# Patient Record
Sex: MALE | Race: WHITE | HISPANIC OR LATINO | URBAN - METROPOLITAN AREA
[De-identification: names, ages, dates, MRNs, and addresses within clinical notes are randomized per-mention and may not be internally consistent; named-entity substitution may affect disease eponyms.]

---

## 2017-02-15 ENCOUNTER — EMERGENCY (EMERGENCY)
Facility: HOSPITAL | Age: 62
LOS: 0 days | Discharge: HOME | End: 2017-02-16

## 2017-06-27 DIAGNOSIS — M25.571 PAIN IN RIGHT ANKLE AND JOINTS OF RIGHT FOOT: ICD-10-CM

## 2017-06-27 DIAGNOSIS — S93.401A SPRAIN OF UNSPECIFIED LIGAMENT OF RIGHT ANKLE, INITIAL ENCOUNTER: ICD-10-CM
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
## 2017-06-27 DIAGNOSIS — L03.116 CELLULITIS OF LEFT LOWER LIMB: ICD-10-CM
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
## 2017-06-27 DIAGNOSIS — E11.9 TYPE 2 DIABETES MELLITUS WITHOUT COMPLICATIONS: ICD-10-CM

## 2019-10-04 ENCOUNTER — EMERGENCY (EMERGENCY)
Facility: HOSPITAL | Age: 64
LOS: 0 days | Discharge: HOME | End: 2019-10-04
Attending: EMERGENCY MEDICINE | Admitting: EMERGENCY MEDICINE
Payer: MEDICARE

## 2019-10-04 VITALS
HEART RATE: 89 BPM | RESPIRATION RATE: 18 BRPM | SYSTOLIC BLOOD PRESSURE: 139 MMHG | DIASTOLIC BLOOD PRESSURE: 97 MMHG | OXYGEN SATURATION: 99 % | TEMPERATURE: 99 F

## 2019-10-04 DIAGNOSIS — R10.9 UNSPECIFIED ABDOMINAL PAIN: ICD-10-CM

## 2019-10-04 DIAGNOSIS — M54.5 LOW BACK PAIN: ICD-10-CM

## 2019-10-04 LAB
APPEARANCE UR: CLEAR — SIGNIFICANT CHANGE UP
BILIRUB UR-MCNC: NEGATIVE — SIGNIFICANT CHANGE UP
COLOR SPEC: SIGNIFICANT CHANGE UP
DIFF PNL FLD: NEGATIVE — SIGNIFICANT CHANGE UP
GLUCOSE UR QL: NEGATIVE — SIGNIFICANT CHANGE UP
KETONES UR-MCNC: NEGATIVE — SIGNIFICANT CHANGE UP
LEUKOCYTE ESTERASE UR-ACNC: NEGATIVE — SIGNIFICANT CHANGE UP
NITRITE UR-MCNC: NEGATIVE — SIGNIFICANT CHANGE UP
PH UR: 6 — SIGNIFICANT CHANGE UP (ref 5–8)
PROT UR-MCNC: SIGNIFICANT CHANGE UP
SP GR SPEC: 1.01 — SIGNIFICANT CHANGE UP (ref 1.01–1.02)
UROBILINOGEN FLD QL: SIGNIFICANT CHANGE UP

## 2019-10-04 PROCEDURE — 99283 EMERGENCY DEPT VISIT LOW MDM: CPT

## 2019-10-04 RX ORDER — METHOCARBAMOL 500 MG/1
2 TABLET, FILM COATED ORAL
Qty: 10 | Refills: 0
Start: 2019-10-04 | End: 2019-10-08

## 2019-10-04 RX ORDER — ACETAMINOPHEN 500 MG
975 TABLET ORAL ONCE
Refills: 0 | Status: COMPLETED | OUTPATIENT
Start: 2019-10-04 | End: 2019-10-04

## 2019-10-04 RX ADMIN — Medication 975 MILLIGRAM(S): at 21:44

## 2019-10-04 NOTE — ED PROVIDER NOTE - PATIENT PORTAL LINK FT
You can access the FollowMyHealth Patient Portal offered by [facility] by registering at the following website: [email or web address]. By joining [employer]’s FollowMyHealth portal, you will also be able to view your health information using other applications (apps) compatible with our system.

## 2019-10-04 NOTE — ED PROVIDER NOTE - NSFOLLOWUPCLINICS_GEN_ALL_ED_FT
[facility]  Medicine  [address], [place]   Phone: [phone]  Fax:   Follow Up Time: 1-3 Days

## 2019-10-04 NOTE — ED PROVIDER NOTE - PHYSICAL EXAMINATION
General: Awake, alert, No acute distress  Eyes: PERRL, EOMI, no icterus, lids and conjunctivae are normal  ENT: External inspection normal, pink/moist membranes, pharynx normal, no pharyngeal erythema/exudate  CV: S1S2, regular rate and rhythm, no murmur/gallops/rubs, no JVD, 2+ pulses b/l, no edema, warm/well-perfused  Respiratory: Normal respiratory rate/effort, no respiratory distress, no wheezing/rales/rhonchi, normal voice, speaking full sentences, lungs clear to auscultation b/l, no retractions, no stridor  Abdomen: Soft abdomen, no tender/distended/guarding/rebound, no CVA tenderness  Musculoskeletal: (+) Left lower back and upper buttock pain (mild ttp). FROM all 4 extremities,  N/V intact, pelvis stable, no TLS spinal tender/deform/step-offs, no edmund tender/deform, stable gait,  negative straight leg.  Neck: FROM neck, supple, no meningismus, trachea midline, no JVD, no cspine tender/step-offs, no lymphadenopathy  Integumentary: Color normal for race, warm and dry, no rash  Neuro: Oriented x3, CN 2-12 grossly intact, normal motor, normal sensory, normal gait, normal cerebellar  Psych: Oriented x3, mood normal, affect normal

## 2019-10-04 NOTE — ED PROVIDER NOTE - OBJECTIVE STATEMENT
64F m pmhx htn p/w left lower back pain, 6/10, shooting pain, toe the left lateral thing. Movement make it worse, rest makes it better. According to family, pt has been sitting home 2/2 to movement. Contrary to triage note pt's pain is in the lower back and lateral thigh.

## 2019-10-04 NOTE — ED PROVIDER NOTE - CLINICAL SUMMARY MEDICAL DECISION MAKING FREE TEXT BOX
64F m pmhx htn p/w left lower back pain, 6/10, shooting, 64F m pmhx htn p/w left lower back pain, 6/10, shooting pain, toe the left lateral thing. Movement make it worse, rest makes it better. According to family, pt has been sitting home 2/2 to movement. On physical pt noted to have left lower back and upper buttock pain to palpation. Also to straight left both legs without difficulty. Pt ambulatory with no gait dysfunction. Pt given Tylenol and script for flexeril sent to pharmacy. Pt's pain seems likely to be msk related with no neuro or vascular deficits. DC home with pmd f/u

## 2019-10-04 NOTE — ED PROVIDER NOTE - NS ED ROS FT
Constitutional:  No behavior changes, fevers/chills.    Head: No injury, headache, LOC, dizziness/LH.    Eyes:  No visual changes, eye pain, redness, or discharge; no injury; no foreign body sensation.    ENMT:  No ear pain or discharge, hearing problems; no pain or injuries to the nose, ears, mouth, or throat.    Neck:  No pain, stiffness, injury; no loss of range of motion.    Cardiac: No chest pain, palpitations, diaphoresis.    Chest/respiratory: No cough, wheezing, shortness of breath, chest tightness, or trouble breathing; no injuries.    GI: No nausea, vomiting, diarrhea or abdominal pain; no injuries. No changes in PO intake. No melena/brbpr.    :  No dysuria, hematuria, urgency, or injuries. No changes in urine output. No flanl     MS: (+) back pain, weakness, injury, numbness or tingling; no loss of range of motion. No edema. No calf pain/swelling/erythema.    Back:  No pain or injury.    Skin:  No rashes or color changes; no lacerations or abrasions.  Social: No sick contacts, recent travel or rash.

## 2019-10-06 LAB
CULTURE RESULTS: NO GROWTH — SIGNIFICANT CHANGE UP
SPECIMEN SOURCE: SIGNIFICANT CHANGE UP

## 2023-04-25 PROBLEM — I10 ESSENTIAL (PRIMARY) HYPERTENSION: Chronic | Status: ACTIVE | Noted: 2019-10-04

## 2023-05-02 PROBLEM — Z00.00 ENCOUNTER FOR PREVENTIVE HEALTH EXAMINATION: Status: ACTIVE | Noted: 2023-05-02

## 2023-05-09 ENCOUNTER — APPOINTMENT (OUTPATIENT)
Dept: RHEUMATOLOGY | Facility: CLINIC | Age: 68
End: 2023-05-09
Payer: MEDICARE

## 2023-05-09 VITALS
BODY MASS INDEX: 43.87 KG/M2 | OXYGEN SATURATION: 93 % | SYSTOLIC BLOOD PRESSURE: 127 MMHG | WEIGHT: 273 LBS | HEART RATE: 95 BPM | DIASTOLIC BLOOD PRESSURE: 84 MMHG | HEIGHT: 66 IN

## 2023-05-09 DIAGNOSIS — M79.672 PAIN IN RIGHT FOOT: ICD-10-CM

## 2023-05-09 DIAGNOSIS — M79.671 PAIN IN RIGHT FOOT: ICD-10-CM

## 2023-05-09 PROCEDURE — 99204 OFFICE O/P NEW MOD 45 MIN: CPT

## 2023-05-09 NOTE — PHYSICAL EXAM
[General Appearance - Alert] : alert [General Appearance - In No Acute Distress] : in no acute distress [Sclera] : the sclera and conjunctiva were normal [PERRL With Normal Accommodation] : pupils were equal in size, round, and reactive to light [Extraocular Movements] : extraocular movements were intact [Outer Ear] : the ears and nose were normal in appearance [Oropharynx] : the oropharynx was normal [Neck Appearance] : the appearance of the neck was normal [Neck Cervical Mass (___cm)] : no neck mass was observed [Jugular Venous Distention Increased] : there was no jugular-venous distention [Thyroid Diffuse Enlargement] : the thyroid was not enlarged [Thyroid Nodule] : there were no palpable thyroid nodules [Auscultation Breath Sounds / Voice Sounds] : lungs were clear to auscultation bilaterally [Heart Rate And Rhythm] : heart rate was normal and rhythm regular [Heart Sounds Gallop] : no gallops [Heart Sounds] : normal S1 and S2 [Murmurs] : no murmurs [Heart Sounds Pericardial Friction Rub] : no pericardial rub [Bowel Sounds] : normal bowel sounds [Abdomen Soft] : soft [Abdomen Tenderness] : non-tender [Abdomen Mass (___ Cm)] : no abdominal mass palpated [Abnormal Walk] : normal gait [Nail Clubbing] : no clubbing  or cyanosis of the fingernails [Musculoskeletal - Swelling] : no joint swelling seen [Motor Tone] : muscle strength and tone were normal [Skin Color & Pigmentation] : normal skin color and pigmentation [Skin Turgor] : normal skin turgor [] : no rash [Sensation] : the sensory exam was normal to light touch and pinprick [Motor Exam] : the motor exam was normal [Affect] : the affect was normal [Mood] : the mood was normal

## 2023-05-09 NOTE — ASSESSMENT
[FreeTextEntry1] : Bilateral alternating big toe, foot and ankle pain\par -History sounds like gout with episodic flares of symptoms especially in his big toe\par -Check bilateral foot x-ray, uric acid, RF, CCP, inflammatory markers, CBC, CMP\par -Start allopurinol 100 mg daily\par -Start colchicine 0.6 mg daily\par \par \par F/u 1 month

## 2023-05-09 NOTE — REVIEW OF SYSTEMS
[Negative] : Integumentary [FreeTextEntry3] : History of congenital cataracts [FreeTextEntry6] : +LUCAS on CPAP

## 2023-05-16 ENCOUNTER — OUTPATIENT (OUTPATIENT)
Dept: OUTPATIENT SERVICES | Facility: HOSPITAL | Age: 68
LOS: 1 days | End: 2023-05-16
Payer: MEDICARE

## 2023-05-16 ENCOUNTER — RESULT REVIEW (OUTPATIENT)
Age: 68
End: 2023-05-16

## 2023-05-16 DIAGNOSIS — M79.671 PAIN IN RIGHT FOOT: ICD-10-CM

## 2023-05-16 PROCEDURE — 73630 X-RAY EXAM OF FOOT: CPT | Mod: 50

## 2023-05-16 PROCEDURE — 73630 X-RAY EXAM OF FOOT: CPT | Mod: 26,50

## 2023-05-17 DIAGNOSIS — M79.671 PAIN IN RIGHT FOOT: ICD-10-CM

## 2023-05-17 LAB
ALBUMIN SERPL ELPH-MCNC: 4.3 G/DL
ALP BLD-CCNC: 83 U/L
ALT SERPL-CCNC: 37 U/L
ANION GAP SERPL CALC-SCNC: 10 MMOL/L
AST SERPL-CCNC: 25 U/L
BASOPHILS # BLD AUTO: 0.03 K/UL
BASOPHILS NFR BLD AUTO: 0.5 %
BILIRUB SERPL-MCNC: 0.3 MG/DL
BUN SERPL-MCNC: 14 MG/DL
CALCIUM SERPL-MCNC: 9.6 MG/DL
CHLORIDE SERPL-SCNC: 104 MMOL/L
CO2 SERPL-SCNC: 26 MMOL/L
CREAT SERPL-MCNC: 0.8 MG/DL
CRP SERPL-MCNC: 8.2 MG/L
EGFR: 96 ML/MIN/1.73M2
EOSINOPHIL # BLD AUTO: 0.19 K/UL
EOSINOPHIL NFR BLD AUTO: 2.9 %
ERYTHROCYTE [SEDIMENTATION RATE] IN BLOOD BY WESTERGREN METHOD: 29 MM/HR
GLUCOSE SERPL-MCNC: 168 MG/DL
HCT VFR BLD CALC: 43.3 %
HGB BLD-MCNC: 13.6 G/DL
IMM GRANULOCYTES NFR BLD AUTO: 0.3 %
LYMPHOCYTES # BLD AUTO: 2.2 K/UL
LYMPHOCYTES NFR BLD AUTO: 33.6 %
MAN DIFF?: NORMAL
MCHC RBC-ENTMCNC: 27.3 PG
MCHC RBC-ENTMCNC: 31.4 G/DL
MCV RBC AUTO: 86.8 FL
MONOCYTES # BLD AUTO: 0.61 K/UL
MONOCYTES NFR BLD AUTO: 9.3 %
NEUTROPHILS # BLD AUTO: 3.49 K/UL
NEUTROPHILS NFR BLD AUTO: 53.4 %
PLATELET # BLD AUTO: 227 K/UL
POTASSIUM SERPL-SCNC: 4.4 MMOL/L
PROT SERPL-MCNC: 7.5 G/DL
RBC # BLD: 4.99 M/UL
RBC # FLD: 15.4 %
RHEUMATOID FACT SER QL: <10 IU/ML
SODIUM SERPL-SCNC: 140 MMOL/L
URATE SERPL-MCNC: 8.7 MG/DL
WBC # FLD AUTO: 6.54 K/UL

## 2023-05-18 LAB
CCP AB SER IA-ACNC: <8 UNITS
RF+CCP IGG SER-IMP: NEGATIVE

## 2023-06-08 ENCOUNTER — APPOINTMENT (OUTPATIENT)
Dept: RHEUMATOLOGY | Facility: CLINIC | Age: 68
End: 2023-06-08
Payer: MEDICARE

## 2023-06-08 VITALS
BODY MASS INDEX: 43.87 KG/M2 | HEIGHT: 66 IN | DIASTOLIC BLOOD PRESSURE: 79 MMHG | SYSTOLIC BLOOD PRESSURE: 154 MMHG | HEART RATE: 104 BPM | WEIGHT: 273 LBS

## 2023-06-08 DIAGNOSIS — R52 PAIN, UNSPECIFIED: ICD-10-CM

## 2023-06-08 PROCEDURE — 99214 OFFICE O/P EST MOD 30 MIN: CPT

## 2023-06-08 NOTE — ASSESSMENT
[FreeTextEntry1] : Bilateral alternating big toe, foot and ankle pain\par -History sounds like gout with episodic flares of symptoms especially in his big toe\par -Uric acid 8.7 \par -Start taking allopurinol 100 mg daily\par -colchicine 0.6 mg daily PRN\par -Check uric acid in 3 months\par \par Left lower leg burning pain\par -History of diabetes and low back surgery\par -See neurology for evaluation\par \par \par \par \par

## 2023-06-08 NOTE — PHYSICAL EXAM
[General Appearance - Alert] : alert [General Appearance - In No Acute Distress] : in no acute distress [Sclera] : the sclera and conjunctiva were normal [PERRL With Normal Accommodation] : pupils were equal in size, round, and reactive to light [Extraocular Movements] : extraocular movements were intact [Outer Ear] : the ears and nose were normal in appearance [Oropharynx] : the oropharynx was normal [Neck Appearance] : the appearance of the neck was normal [Neck Cervical Mass (___cm)] : no neck mass was observed [Jugular Venous Distention Increased] : there was no jugular-venous distention [Thyroid Diffuse Enlargement] : the thyroid was not enlarged [Thyroid Nodule] : there were no palpable thyroid nodules [Auscultation Breath Sounds / Voice Sounds] : lungs were clear to auscultation bilaterally [Heart Rate And Rhythm] : heart rate was normal and rhythm regular [Heart Sounds] : normal S1 and S2 [Heart Sounds Gallop] : no gallops [Murmurs] : no murmurs [Heart Sounds Pericardial Friction Rub] : no pericardial rub [Bowel Sounds] : normal bowel sounds [Abdomen Tenderness] : non-tender [Abdomen Soft] : soft [Abdomen Mass (___ Cm)] : no abdominal mass palpated [Abnormal Walk] : normal gait [Nail Clubbing] : no clubbing  or cyanosis of the fingernails [Musculoskeletal - Swelling] : no joint swelling seen [Motor Tone] : muscle strength and tone were normal [Skin Color & Pigmentation] : normal skin color and pigmentation [Skin Turgor] : normal skin turgor [] : no rash [Sensation] : the sensory exam was normal to light touch and pinprick [Motor Exam] : the motor exam was normal [Affect] : the affect was normal [Mood] : the mood was normal

## 2023-06-08 NOTE — HISTORY OF PRESENT ILLNESS
[FreeTextEntry1] : 6/8/23:\par He reports 4 days since last visit that he used colchicine and allopurinol when he felt pain when putting on his shoes. He has not had red, hot or swollen joints. He gets diarrhea with colchicine. He reports 2 month history of burning pain in his left lower leg radiating to his foot that feels like it's "on fire". He was put on gabapentin 300 mg 4x/daily by his endocrinologist that helped his R leg burning pain. Of note he has low back surgery in January and had burning pains but they were not as bad.\par \par \par Initial visit:\par He reports pain and swelling in his big toes and feet initially his right foot for a few days, flares and then goes away and travels to his left foot and toes for a few days. He can't wiggle his toe because of the pain and swelling when he gets it. Symptoms would come up again a few weeks and this has been ongoing for a couple of years. Ibuprofein helped his symptoms Tylenol didn't help. Last attack was 2 weeks ago. He was prescribed colchicine by his PCP that helped resolve his pain and he took this only once. Subsequent attacks after colchicine have not been as bad. He sometimes gets pain in his ankles too. His uric acid was apparently normal.

## 2023-06-29 ENCOUNTER — APPOINTMENT (OUTPATIENT)
Dept: VASCULAR SURGERY | Facility: CLINIC | Age: 68
End: 2023-06-29
Payer: MEDICARE

## 2023-06-29 VITALS — DIASTOLIC BLOOD PRESSURE: 79 MMHG | SYSTOLIC BLOOD PRESSURE: 148 MMHG

## 2023-06-29 DIAGNOSIS — Z86.69 PERSONAL HISTORY OF OTHER DISEASES OF THE NERVOUS SYSTEM AND SENSE ORGANS: ICD-10-CM

## 2023-06-29 PROCEDURE — 99203 OFFICE O/P NEW LOW 30 MIN: CPT

## 2023-06-29 PROCEDURE — 93970 EXTREMITY STUDY: CPT

## 2023-06-29 NOTE — DATA REVIEWED
[FreeTextEntry1] : I performed a venous duplex which was medically necessary to evaluate for venous reflux. It showed no evidence of GSV reflux bilaterally, chronic changes in the femoral and popliteal veins bilaterally.\par

## 2023-06-29 NOTE — HISTORY OF PRESENT ILLNESS
[FreeTextEntry1] : 69 y/o gentleman with h/o DM, and neuropathy presents for worsening left leg swelling and discoloration, denies any h/o DVT. No h/o cellulitis or ulcers. Tried compression stockings in the past but could not tolerate them.

## 2023-06-29 NOTE — ASSESSMENT
[FreeTextEntry1] : 67 y/o gentleman with venous insufficiency.\par \par No evidence of acute DVT in the lower extremities on duplex today. \par \par I recommend compression with ace bandage daily.

## 2023-06-30 ENCOUNTER — APPOINTMENT (OUTPATIENT)
Dept: ENDOCRINOLOGY | Facility: CLINIC | Age: 68
End: 2023-06-30
Payer: MEDICARE

## 2023-06-30 VITALS
TEMPERATURE: 97.4 F | HEART RATE: 86 BPM | OXYGEN SATURATION: 98 % | DIASTOLIC BLOOD PRESSURE: 88 MMHG | HEIGHT: 66 IN | WEIGHT: 273 LBS | SYSTOLIC BLOOD PRESSURE: 148 MMHG | BODY MASS INDEX: 43.87 KG/M2

## 2023-06-30 PROCEDURE — 99203 OFFICE O/P NEW LOW 30 MIN: CPT

## 2023-06-30 NOTE — REVIEW OF SYSTEMS
[Fatigue] : fatigue [Recent Weight Gain (___ Lbs)] : no recent weight gain [Recent Weight Loss (___ Lbs)] : no recent weight loss [Dysphagia] : no dysphagia [Neck Pain] : no neck pain [Dysphonia] : no dysphonia [Chest Pain] : no chest pain [Palpitations] : no palpitations [Fast Heart Rate] : heart rate is not fast [Lower Ext Edema] : lower extremity edema [Shortness Of Breath] : no shortness of breath [SOB on Exertion] : no shortness of breath on exertion [Nausea] : no nausea [Constipation] : constipation [Vomiting] : no vomiting [As Noted in HPI] : as noted in HPI [Acanthosis] : acanthosis [Pain/Numbness of Digits] : pain/numbness of digits [Cold Intolerance] : no cold intolerance [Heat Intolerance] : no heat intolerance

## 2023-06-30 NOTE — REASON FOR VISIT
[Initial Evaluation] : an initial evaluation [DM Type 2] : DM Type 2 [Weight Management/Obesity] : weight management/obesity [FreeTextEntry2] : dr damon  no...

## 2023-06-30 NOTE — HISTORY OF PRESENT ILLNESS
[FreeTextEntry1] : 68 year old male patient with  type 2 DM , Obesity , LUCAS , HTN who present for evaluation of type 2 DM \par \par \par Diagnosis: few years ago \par Current Regimen: metformin 500 mg BID \par Previous regimens: used ozempic for maybe  1 month but insurance stopped covering it \par Compliance: ok\par SMBG/CGM :  ok\par Hypoglycemia: no \par Polyuria/polydipsia :  no \par Weight change/BMI: same\par Diet: try to limit carbs \par Exercise: limited due to back problems \par HBa1c trend: 6/2023: A1c 7.1%  \par \par \par .prevention  \par Statin: no \par ACE/ARB :  benazepril \par Eye examination:  followed has glaucoma , due to go now \par Neuropathy: no but has PVD and skin changes \par

## 2023-06-30 NOTE — ASSESSMENT
[FreeTextEntry1] : 68 year old male patient with  type 2 DM , Obesity , LUCAS , HTN who present for evaluation of type 2 DM \par \par # type 2 Dm , obesity , HTN \par - A1c 7.1% on  metformin 500 mg BID \par - Previous regimens: used ozempic for maybe  1 month but insurance stopped covering it \par - increase metformin to 1000 mg BID for now, and monitor FS \par - will benefit from Glp1 agonist if numbers trending up \par - check lipid panel \par - check ACR continue Ace inh \par - opthalmology follow up and podiatry follow up , seen also by vascular \par

## 2023-06-30 NOTE — PHYSICAL EXAM
[Alert] : alert [Well Nourished] : well nourished [Obese] : obese [No Acute Distress] : no acute distress [No Lid Lag] : no lid lag [Thyroid Not Enlarged] : the thyroid was not enlarged [No Respiratory Distress] : no respiratory distress [No Accessory Muscle Use] : no accessory muscle use [Clear to Auscultation] : lungs were clear to auscultation bilaterally [Normal S1, S2] : normal S1 and S2 [No Murmurs] : no murmurs [Regular Rhythm] : with a regular rhythm [Not Tender] : non-tender [Soft] : abdomen soft [No Stigmata of Cushings Syndrome] : no stigmata of Cushings Syndrome [Acanthosis Nigricans] : acanthosis nigricans present [No Tremors] : no tremors [Oriented x3] : oriented to person, place, and time

## 2023-07-19 ENCOUNTER — APPOINTMENT (OUTPATIENT)
Dept: NEUROLOGY | Facility: CLINIC | Age: 68
End: 2023-07-19
Payer: MEDICARE

## 2023-07-19 VITALS
WEIGHT: 270 LBS | HEART RATE: 97 BPM | HEIGHT: 66 IN | SYSTOLIC BLOOD PRESSURE: 153 MMHG | DIASTOLIC BLOOD PRESSURE: 112 MMHG | BODY MASS INDEX: 43.39 KG/M2

## 2023-07-19 DIAGNOSIS — Z87.898 PERSONAL HISTORY OF OTHER SPECIFIED CONDITIONS: ICD-10-CM

## 2023-07-19 PROCEDURE — 99204 OFFICE O/P NEW MOD 45 MIN: CPT

## 2023-07-19 NOTE — HISTORY OF PRESENT ILLNESS
[FreeTextEntry1] : Mr. Chowdhury is a 68-year-old male who presents today with a history of chronic diabetes and obesity. He complains of symptoms of polyneuropathy and left lumbar radiculopathy. He was operated on 1/3/2023 at [facility] in [place], [place] for symptoms of left sided sciatica. This improved his walking which was very impaired due to pain from the left lumbar region. He did have burning sensations in both lower extremities which only improved slightly in the right leg on gabapentin 400 mg TID. He still has burning sensation in the medial aspect of the left leg to the left ankle. Patient also has swelling in the left lower leg and pigmentation/discoloration which he has had chronically. His A1C hemoglobin has improved from 7.7 to 7.1,

## 2023-07-19 NOTE — REVIEW OF SYSTEMS
[Abnormal Sensation] : an abnormal sensation [Arthralgias] : arthralgias [Limb Swelling] : limb swelling [de-identified] : limping gait tending to drag the left leg

## 2023-07-19 NOTE — ASSESSMENT
[FreeTextEntry1] : Impression is that of diabetic polyneuropathy, morbid obesity, chronic venous insufficiency, and left lumbar radiculopathy. I requested the results of the MRI of the lumbar spine from [facility] in [place]. Gabapentin was increased to 600 mg QID. He will undergo EMG/NCVs of the upper and lower extremities to assess the severity of his neuropathy. We will see him back in follow up in 1 month for reevaluation. \par \par Total clinician time spent today on the patient is 40 minutes including preparing to see the patient, obtaining and/or reviewing and confirming history, performing medically necessary and appropriate examination, counseling and educating the patient and/or family, documenting clinical information in the EHR and communicating and/or referring to other healthcare professionals.\par \par Entered by Radha Redmond acting as scribe for Dr. Maya.\par \par \par The documentation recorded by the scribe, in my presence, accurately reflects the service I personally performed, and the decisions made by me with my edits as appropriate. \par Tello Maya MD, FAAN, FACP\par Diplomate American Board of Psychiatry & Neurology\par \par

## 2023-07-19 NOTE — PHYSICAL EXAM
[FreeTextEntry1] : PHYSICAL EXAMINATION:\par Head: Normocephalic, atraumatic. Negative TA tenderness/prominence. \par \par Neck: Supple with full range of motion; nontender with negative bilateral Spurling's signs. \par \par Spine: Full range of motion; nontender. Negative straight leg raise maneuvers. \par \par Extremities: Swelling in the left leg below the knee. Evidence of venous insufficiency in the lower leg on the left. \par \par NEUROLOGICAL EXAMINATION: \par \par Mental Status: Patient is a good informant with intact orientation, attention, concentration, recent and remote memory. Language evaluation reveals no evidence of aphasia. Fund of knowledge is normal. \par \par Cranial Nerves Cranial Nerves: \par \par II, III, IV, VI: Pupils are equal, round, and reactive to light and accommodation. No evidence of afferent pupillary defect. Visual fields are full to confrontation. Eye movements are full without evidence of nystagmus or internuclear ophthalmoplegia. Funduscopic examination reveals sharp disc margins. \par \par V: Normal jaw movements. Normal facial sensation. \par \par VII: Normal facial motor testing. \par \par VIII: Grossly normal hearing bilaterally. \par \par IX, X: Palate moves symmetrically. No dysarthria. \par \par XI: Normal shoulder shrug and sternocleidomastoid power. \par \par XII: Tongue appears normal and protrudes in the midline. \par \par Motor: Normal bulk, tone, and power throughout. \par \par Muscle Stretch Reflexes (right/left): Hypoactive in the UEs, unobtainable in the knees and ankles.\par \par Coordination: Normal finger to nose, no truncal ataxia and no tremor. \par \par Sensation: Normal primary sensation. Normal double simultaneous stimulation. \par \par Gait and Station: Waddling, limping gait dragging the left leg with small steps that are shuffling.. Romberg and tandem were not able to be tested due to instability walking with a  cane.

## 2023-07-25 ENCOUNTER — APPOINTMENT (OUTPATIENT)
Dept: NEUROLOGY | Facility: CLINIC | Age: 68
End: 2023-07-25
Payer: MEDICARE

## 2023-07-25 PROCEDURE — 95886 MUSC TEST DONE W/N TEST COMP: CPT

## 2023-07-25 PROCEDURE — 95912 NRV CNDJ TEST 11-12 STUDIES: CPT

## 2023-07-28 ENCOUNTER — APPOINTMENT (OUTPATIENT)
Dept: NEUROLOGY | Facility: CLINIC | Age: 68
End: 2023-07-28
Payer: MEDICARE

## 2023-07-28 PROCEDURE — 95886 MUSC TEST DONE W/N TEST COMP: CPT

## 2023-07-28 PROCEDURE — 95911 NRV CNDJ TEST 9-10 STUDIES: CPT

## 2023-08-09 PROBLEM — Z78.9 NON-SMOKER: Status: ACTIVE | Noted: 2023-05-09

## 2023-08-09 PROBLEM — Z86.79 HISTORY OF HYPERTENSION: Status: RESOLVED | Noted: 2023-05-09 | Resolved: 2023-08-09

## 2023-08-09 PROBLEM — Z82.49 FAMILY HISTORY OF HYPERTENSION: Status: ACTIVE | Noted: 2023-06-30

## 2023-08-09 PROBLEM — Z82.49 FAMILY HISTORY OF CARDIAC DISORDER: Status: ACTIVE | Noted: 2023-06-30

## 2023-08-09 PROBLEM — Z87.39 HISTORY OF GOUT: Status: RESOLVED | Noted: 2023-06-29 | Resolved: 2023-08-09

## 2023-08-09 PROBLEM — Z78.9 SOCIAL ALCOHOL USE: Status: ACTIVE | Noted: 2023-05-09

## 2023-08-09 PROBLEM — Z86.69 HISTORY OF SLEEP APNEA: Status: RESOLVED | Noted: 2023-06-29 | Resolved: 2023-08-09

## 2023-08-10 ENCOUNTER — APPOINTMENT (OUTPATIENT)
Dept: CARDIOLOGY | Facility: CLINIC | Age: 68
End: 2023-08-10
Payer: MEDICARE

## 2023-08-10 VITALS — SYSTOLIC BLOOD PRESSURE: 124 MMHG | DIASTOLIC BLOOD PRESSURE: 84 MMHG | HEART RATE: 113 BPM

## 2023-08-10 VITALS — WEIGHT: 281 LBS | BODY MASS INDEX: 45.16 KG/M2 | TEMPERATURE: 97.6 F | HEIGHT: 66 IN

## 2023-08-10 DIAGNOSIS — Z82.49 FAMILY HISTORY OF ISCHEMIC HEART DISEASE AND OTHER DISEASES OF THE CIRCULATORY SYSTEM: ICD-10-CM

## 2023-08-10 DIAGNOSIS — Z86.69 PERSONAL HISTORY OF OTHER DISEASES OF THE NERVOUS SYSTEM AND SENSE ORGANS: ICD-10-CM

## 2023-08-10 DIAGNOSIS — Z78.9 OTHER SPECIFIED HEALTH STATUS: ICD-10-CM

## 2023-08-10 DIAGNOSIS — Z87.39 PERSONAL HISTORY OF OTHER DISEASES OF THE MUSCULOSKELETAL SYSTEM AND CONNECTIVE TISSUE: ICD-10-CM

## 2023-08-10 DIAGNOSIS — R07.9 CHEST PAIN, UNSPECIFIED: ICD-10-CM

## 2023-08-10 DIAGNOSIS — Z86.79 PERSONAL HISTORY OF OTHER DISEASES OF THE CIRCULATORY SYSTEM: ICD-10-CM

## 2023-08-10 DIAGNOSIS — Z87.891 PERSONAL HISTORY OF NICOTINE DEPENDENCE: ICD-10-CM

## 2023-08-10 PROCEDURE — 93000 ELECTROCARDIOGRAM COMPLETE: CPT

## 2023-08-10 PROCEDURE — 99204 OFFICE O/P NEW MOD 45 MIN: CPT

## 2023-08-10 RX ORDER — DORZOLAMIDE HYDROCHLORIDE 20 MG/ML
2 SOLUTION OPHTHALMIC
Refills: 0 | Status: ACTIVE | COMMUNITY

## 2023-08-10 RX ORDER — METFORMIN HYDROCHLORIDE 500 MG/1
500 TABLET, COATED ORAL
Refills: 0 | Status: DISCONTINUED | COMMUNITY
End: 2023-08-10

## 2023-08-10 RX ORDER — GABAPENTIN 300 MG/1
300 CAPSULE ORAL
Refills: 0 | Status: DISCONTINUED | COMMUNITY
End: 2023-08-10

## 2023-08-10 NOTE — PHYSICAL EXAM
[Well Developed] : well developed [Well Nourished] : well nourished [No Acute Distress] : no acute distress [Normal Conjunctiva] : normal conjunctiva [Normal Venous Pressure] : normal venous pressure [No Carotid Bruit] : no carotid bruit [5th Left ICS - MCL] : palpated at the 5th LICS in the midclavicular line [Normal] : normal [No Precordial Heave] : no precordial heave was noted [Normal Rate] : normal [Rhythm Regular] : regular [Normal S1] : normal S1 [Normal S2] : normal S2 [No Murmur] : no murmurs heard [No Pitting Edema] : no pitting edema present [2+] : left 2+ [Clear Lung Fields] : clear lung fields [Good Air Entry] : good air entry [No Respiratory Distress] : no respiratory distress  [Soft] : abdomen soft [Non Tender] : non-tender [No Masses/organomegaly] : no masses/organomegaly [Normal Bowel Sounds] : normal bowel sounds [Normal Gait] : normal gait [No Edema] : no edema [No Cyanosis] : no cyanosis [No Clubbing] : no clubbing [No Varicosities] : no varicosities [No Rash] : no rash [No Skin Lesions] : no skin lesions [Moves all extremities] : moves all extremities [No Focal Deficits] : no focal deficits [Normal Speech] : normal speech [Alert and Oriented] : alert and oriented [Normal memory] : normal memory [Edema ___] : edema [unfilled] [Venous stasis] : venous stasis [Venous varicosities] : venous varicosities [de-identified] : LLE with chronic venous skin changes and indurated edema.

## 2023-08-10 NOTE — ASSESSMENT
[FreeTextEntry1] : Chest pain/leg edema - will obtain TTE - Nuclear stress test. Patient walks with a cane due to chronic back pain and Hx of back surgery - No DVT, no reflux on venous doppler  DM type II: HA1c 7.1%.  - follow with endocrinology - continue Metformin  - will start Rosuvastatin 20 mg PO daily - Lipid profile   HTN: BP at goal per ACC/AHA 2018 guidelines - continue Irbesartan - low sodium diet  F/U in 3 months

## 2023-08-10 NOTE — HISTORY OF PRESENT ILLNESS
[FreeTextEntry1] : Mr. Chowdhury is a 67yo M with PMHx of HTN, DM, gout, venous insufficiency, LUCAS who presents to establish care. His PMD is Dr. Jeremiah Florence referred him to cardiology for evaluation. Patient reports one episode of chest pressure a few months ago which lasted a few minutes and was relieved with rest. He was seen by cardiology more than 20 years ago. Denies palpitations, dyspnea, syncope. Has Hx of chronic back pain from lumbar radiculopathy which limits his mobility.

## 2023-08-10 NOTE — REVIEW OF SYSTEMS
Cardiac [Negative] : Heme/Lymph [Chest Discomfort] : chest discomfort [Lower Ext Edema] : lower extremity edema [FreeTextEntry9] : reports "burning" in LE, Hx of neuropathy

## 2023-08-10 NOTE — REASON FOR VISIT
[Other: ____] : [unfilled] [FreeTextEntry1] : Diagnostic Tests: ------------------------ EK/10/23-Sinus tachycardia at 113 bpm. Incomplete RBBB. LAFB.  ------------------------ US:  23-LE Venous: Negative for DVT. No reflux on right. S/P LGSV stripping. No reflux seen on left.

## 2023-08-15 ENCOUNTER — APPOINTMENT (OUTPATIENT)
Dept: NEUROLOGY | Facility: CLINIC | Age: 68
End: 2023-08-15
Payer: MEDICARE

## 2023-08-15 VITALS — BODY MASS INDEX: 45.48 KG/M2 | WEIGHT: 283 LBS | HEIGHT: 66 IN

## 2023-08-15 PROCEDURE — 99214 OFFICE O/P EST MOD 30 MIN: CPT

## 2023-08-15 NOTE — REVIEW OF SYSTEMS
[Abnormal Sensation] : an abnormal sensation [Arthralgias] : arthralgias [Limb Swelling] : limb swelling [de-identified] : limping gait tending to drag the left leg

## 2023-08-15 NOTE — HISTORY OF PRESENT ILLNESS
[FreeTextEntry1] : Original Presentation : Mr. Chowdhury is a 68-year-old male who presents today with a history of chronic diabetes and obesity. He complains of symptoms of polyneuropathy and left lumbar radiculopathy. He was operated on 1/3/2023 at [facility] in [place], [place] for symptoms of left sided sciatica. This improved his walking which was very impaired due to pain from the left lumbar region. He did have burning sensations in both lower extremities which only improved slightly in the right leg on gabapentin 400 mg TID. He still has burning sensation in the medial aspect of the left leg to the left ankle. Patient also has swelling in the left lower leg and pigmentation/discoloration which he has had chronically. His A1C hemoglobin has improved from 7.7 to 7.1.   Today : Today I had the pleasure of seeing  Mr. Chowdhury   in our office for follow up.  The patients previous history and physical findings have been reviewed.     Mr. Chowdhury remains under our care for diabetic polyneuropathy, morbid obesity, and left lumbar radiculopathy, chronic conditions for which he is receiving active treatment for. At his previous visit his Gabapentin was increased to 600mg QID and he states that with this new dosage he has experienced significant improvement in the pain to his lower extremities. Of note Mr. Chowdhury is under the care of Vascular Dr. Presley for chronic venous insufficiency and has significant venous stasis to his lower extremities. Today we reviewed the results of his EMG's of the upper and lower extremities. On exam he ambulates with a cane, has 4-/5 power to b/l LE good dorsiflexion / plantarflexion and denies saddle anesthesia or urinary / bowel incontinence or retention.

## 2023-08-15 NOTE — PHYSICAL EXAM
[General Appearance - Alert] : alert [General Appearance - In No Acute Distress] : in no acute distress [Oriented To Time, Place, And Person] : oriented to person, place, and time [Impaired Insight] : insight and judgment were intact [Affect] : the affect was normal [Person] : oriented to person [Place] : oriented to place [Time] : oriented to time [Concentration Intact] : normal concentrating ability [Visual Intact] : visual attention was ~T not ~L decreased [Naming Objects] : no difficulty naming common objects [Repeating Phrases] : no difficulty repeating a phrase [Writing A Sentence] : no difficulty writing a sentence [Fluency] : fluency intact [Comprehension] : comprehension intact [Reading] : reading intact [Past History] : adequate knowledge of personal past history [Cranial Nerves Optic (II)] : visual acuity intact bilaterally,  visual fields full to confrontation, pupils equal round and reactive to light [Cranial Nerves Oculomotor (III)] : extraocular motion intact [Cranial Nerves Trigeminal (V)] : facial sensation intact symmetrically [Cranial Nerves Facial (VII)] : face symmetrical [Cranial Nerves Vestibulocochlear (VIII)] : hearing was intact bilaterally [Cranial Nerves Glossopharyngeal (IX)] : tongue and palate midline [Cranial Nerves Accessory (XI - Cranial And Spinal)] : head turning and shoulder shrug symmetric [Cranial Nerves Hypoglossal (XII)] : there was no tongue deviation with protrusion [Motor Tone] : muscle tone was normal in all four extremities [Motor Strength] : muscle strength was normal in all four extremities [Sensation Tactile Decrease] : light touch was intact [Dysesthesia] : dysesthesia was present [Abnormal Walk] : normal gait [Balance] : balance was intact [Past-pointing] : there was no past-pointing [Tremor] : no tremor present [2+] : Ankle jerk left 2+ [Plantar Reflex Right Only] : normal on the right [Plantar Reflex Left Only] : normal on the left [FreeTextEntry6] : UE 5/5 b/l  LE 4-/5  [PERRL With Normal Accommodation] : pupils were equal in size, round, reactive to light, with normal accommodation [Neck Appearance] : the appearance of the neck was normal [Involuntary Movements] : no involuntary movements were seen [FreeTextEntry1] : Venous stasis ulcers left LE

## 2023-08-24 PROBLEM — Z86.39 HISTORY OF DIABETES MELLITUS: Status: RESOLVED | Noted: 2023-05-09 | Resolved: 2023-08-24

## 2023-08-24 RX ORDER — ALLOPURINOL 100 MG/1
100 TABLET ORAL
Qty: 30 | Refills: 2 | Status: DISCONTINUED | COMMUNITY
Start: 2023-05-09 | End: 2023-08-24

## 2023-08-24 RX ORDER — ROSUVASTATIN CALCIUM 20 MG/1
20 TABLET, FILM COATED ORAL
Qty: 30 | Refills: 3 | Status: DISCONTINUED | COMMUNITY
Start: 2023-08-10 | End: 2023-08-24

## 2023-08-24 RX ORDER — ASPIRIN 81 MG
81 TABLET, DELAYED RELEASE (ENTERIC COATED) ORAL
Refills: 0 | Status: DISCONTINUED | COMMUNITY
End: 2023-08-24

## 2023-08-24 RX ORDER — IRBESARTAN 300 MG/1
300 TABLET, FILM COATED ORAL
Refills: 0 | Status: DISCONTINUED | COMMUNITY
End: 2023-08-24

## 2023-08-24 RX ORDER — COLCHICINE 0.6 MG/1
0.6 TABLET ORAL DAILY
Qty: 30 | Refills: 2 | Status: DISCONTINUED | COMMUNITY
Start: 2023-05-09 | End: 2023-08-24

## 2023-08-29 ENCOUNTER — APPOINTMENT (OUTPATIENT)
Dept: GASTROENTEROLOGY | Facility: CLINIC | Age: 68
End: 2023-08-29
Payer: MEDICARE

## 2023-08-29 DIAGNOSIS — Z12.11 ENCOUNTER FOR SCREENING FOR MALIGNANT NEOPLASM OF COLON: ICD-10-CM

## 2023-08-29 DIAGNOSIS — Z86.39 PERSONAL HISTORY OF OTHER ENDOCRINE, NUTRITIONAL AND METABOLIC DISEASE: ICD-10-CM

## 2023-08-29 DIAGNOSIS — Z86.69 PERSONAL HISTORY OF OTHER DISEASES OF THE NERVOUS SYSTEM AND SENSE ORGANS: ICD-10-CM

## 2023-08-29 PROCEDURE — 99204 OFFICE O/P NEW MOD 45 MIN: CPT | Mod: 95

## 2023-08-29 RX ORDER — POLYETHYLENE GLYCOL 3350, SODIUM SULFATE, SODIUM CHLORIDE, POTASSIUM CHLORIDE, ASCORBIC ACID, SODIUM ASCORBATE 140-9-5.2G
140 KIT ORAL
Qty: 1 | Refills: 0 | Status: ACTIVE | COMMUNITY
Start: 2023-08-29 | End: 1900-01-01

## 2023-08-29 RX ORDER — ALLOPURINOL 200 MG/1
TABLET ORAL
Refills: 0 | Status: ACTIVE | COMMUNITY

## 2023-08-29 RX ORDER — LATANOPROST/PF 0.005 %
DROPS OPHTHALMIC (EYE)
Refills: 0 | Status: ACTIVE | COMMUNITY

## 2023-08-29 RX ORDER — IRBESARTAN 300 MG/1
TABLET ORAL
Refills: 0 | Status: ACTIVE | COMMUNITY

## 2023-08-29 RX ORDER — ASPIRIN 81 MG
81 TABLET, DELAYED RELEASE (ENTERIC COATED) ORAL
Refills: 0 | Status: ACTIVE | COMMUNITY

## 2023-08-29 NOTE — REASON FOR VISIT
[Home] : at home, [unfilled] , at the time of the visit. [Medical Office: ([facility])___] : at the medical office located in  [Patient] : the patient [FreeTextEntry4] : Keeley Arias  [FreeTextEntry1] : CRC screening

## 2023-08-29 NOTE — PHYSICAL EXAM
[Alert] : alert [Sclera] : the sclera and conjunctiva were normal [Hearing Threshold Finger Rub Not [place]] : hearing was normal [Normal Appearance] : the appearance of the neck was normal [No Respiratory Distress] : no respiratory distress [Normal Color / Pigmentation] : normal skin color and pigmentation [Oriented To Time, Place, And Person] : oriented to person, place, and time

## 2023-08-29 NOTE — ASSESSMENT
[FreeTextEntry1] : 68-year-old male average risk for CRC, personal hx of colon polyps, DM, HTN, LUCAS on CPAP, presents for surveillance colonoscopy.  Denies any GI complaints and no weight loss.  Might have constipation on and off.   Screening colonoscopy Risks and benefits discussed with patient. stool softeners for a week PTP.  Plenvu and dulcolax

## 2023-08-29 NOTE — HISTORY OF PRESENT ILLNESS
[FreeTextEntry1] : 68-year-old male average risk for CRC, personal hx of colon polyps, DM, HTN, LUCAS on CPAP, presents for surveillance colonoscopy.  Denies any GI complaints and no weight loss.  Might have constipation on and off.

## 2023-09-07 ENCOUNTER — APPOINTMENT (OUTPATIENT)
Dept: RHEUMATOLOGY | Facility: CLINIC | Age: 68
End: 2023-09-07

## 2023-09-12 ENCOUNTER — APPOINTMENT (OUTPATIENT)
Dept: CARDIOLOGY | Facility: CLINIC | Age: 68
End: 2023-09-12
Payer: MEDICARE

## 2023-09-12 PROCEDURE — 93306 TTE W/DOPPLER COMPLETE: CPT

## 2023-11-17 ENCOUNTER — APPOINTMENT (OUTPATIENT)
Dept: NEUROLOGY | Facility: CLINIC | Age: 68
End: 2023-11-17
Payer: MEDICARE

## 2023-11-17 ENCOUNTER — EMERGENCY (EMERGENCY)
Facility: HOSPITAL | Age: 68
LOS: 0 days | Discharge: HOME | End: 2023-11-17
Attending: EMERGENCY MEDICINE
Payer: MEDICARE

## 2023-11-17 VITALS
HEART RATE: 101 BPM | WEIGHT: 272.93 LBS | HEIGHT: 66 IN | OXYGEN SATURATION: 93 % | DIASTOLIC BLOOD PRESSURE: 93 MMHG | RESPIRATION RATE: 17 BRPM | TEMPERATURE: 98 F | SYSTOLIC BLOOD PRESSURE: 138 MMHG

## 2023-11-17 VITALS
DIASTOLIC BLOOD PRESSURE: 82 MMHG | HEART RATE: 93 BPM | OXYGEN SATURATION: 96 % | SYSTOLIC BLOOD PRESSURE: 129 MMHG | RESPIRATION RATE: 17 BRPM | TEMPERATURE: 98 F

## 2023-11-17 DIAGNOSIS — R60.0 LOCALIZED EDEMA: ICD-10-CM

## 2023-11-17 DIAGNOSIS — E11.40 TYPE 2 DIABETES MELLITUS WITH DIABETIC NEUROPATHY, UNSPECIFIED: ICD-10-CM

## 2023-11-17 DIAGNOSIS — I10 ESSENTIAL (PRIMARY) HYPERTENSION: ICD-10-CM

## 2023-11-17 DIAGNOSIS — E66.01 MORBID (SEVERE) OBESITY DUE TO EXCESS CALORIES: ICD-10-CM

## 2023-11-17 DIAGNOSIS — Z88.7 ALLERGY STATUS TO SERUM AND VACCINE: ICD-10-CM

## 2023-11-17 PROCEDURE — 99284 EMERGENCY DEPT VISIT MOD MDM: CPT | Mod: 25

## 2023-11-17 PROCEDURE — 93970 EXTREMITY STUDY: CPT

## 2023-11-17 PROCEDURE — 99284 EMERGENCY DEPT VISIT MOD MDM: CPT

## 2023-11-17 PROCEDURE — 99214 OFFICE O/P EST MOD 30 MIN: CPT

## 2023-11-17 PROCEDURE — 93970 EXTREMITY STUDY: CPT | Mod: 26

## 2023-11-17 NOTE — ED PROVIDER NOTE - OBJECTIVE STATEMENT
Patient is a 68-year-old man with a history of hypertension, diabetes (states his last A1c in the sevens), glaucoma, neuropathy, allergic to tetanus vaccination, chronic left lower extremity venous insufficiency, presenting for weeping from the left lower extremity for the last few days.  Patient states that he sustained an injury to his left lower extremity over 40 years ago, and has been having "circulation problems" ever since.  He most recently saw a vascular surgeon over a year ago, but has not followed up since.  Patient has noticed that the left leg has been having weeping over the last few days, which is what prompted the ED visit. Patient ambulates with a cane at baseline. No prolonged immobilization, malignancy, prior DVT. No motor or sensory deficits. No poikilothermia.

## 2023-11-17 NOTE — ED PROVIDER NOTE - PROGRESS NOTE DETAILS
Resident ANUPAMA Rojo: Duplex negative for DVT.  Clinically low suspicion for infectious etiology. Discussed management, including compression and elevation.  Patient will be provided vascular surgery follow-up.

## 2023-11-17 NOTE — ED ADULT NURSE NOTE - NSICDXPASTMEDICALHX_GEN_ALL_CORE_FT
"Subjective:    Patient ID:  Lashanda Hale is a 51 y.o. female who presents for follow-up of Chronic heart failure, unspecified heart failure type      HPI    This is a 51 y/o woman with PMHx of paroxysmal atrial fibrillation, DM, and presumed NICM (PET negative 2013) who [place] 9/29/15 [place] [facility] [place] witnessed VT arrest. Hypothermic protocol was undertaken and her rewarming process was complicated by episodes of prolonged QT/torsades. Had a single chamber Biotronik ICD placed and was started on amiodarone.      Patient last was seen in my clinic on 2/15/16 at which time here Lisinopril was increased.    On assessment today, the patient states that she feels well and has no major episodes of SOB, leg swelling, or unexpected weight gain. Does not feel hindered in any way in her life.    Not taking BP at home.    Review of Systems   Constitution: Negative for decreased appetite, fever, weakness and malaise/fatigue.   Cardiovascular: Negative for chest pain, dyspnea on exertion, irregular heartbeat and leg swelling.   Respiratory: Negative for cough, hemoptysis, shortness of breath and wheezing.    Endocrine: Negative for cold intolerance and heat intolerance.   Musculoskeletal: Negative for muscle weakness and myalgias.   Gastrointestinal: Negative for abdominal pain, constipation and diarrhea.   Genitourinary: Negative for bladder incontinence.   Psychiatric/Behavioral: Negative for depression.        Objective:  Vitals:    04/24/17 0902   BP: (!) 160/72   BP Location: Left arm   Patient Position: Sitting   BP Method: Automatic   Pulse: (!) 59   Weight: 66.5 kg (146 lb 9.7 oz)   Height: 5' 6.5" (1.689 m)       Physical Exam   Constitutional: She is oriented to person, place, and time. She appears well-developed and well-nourished.   Neck: No JVD present.   Cardiovascular: Normal rate, regular rhythm and normal heart sounds.  Exam reveals no gallop and no " friction rub.    No murmur heard.  Pulmonary/Chest: Breath sounds normal. No respiratory distress. She has no wheezes. She has no rales.   Abdominal: Soft. Bowel sounds are normal. There is no tenderness. There is no rebound and no guarding.   Musculoskeletal: She exhibits no edema or tenderness.   Neurological: She is alert and oriented to person, place, and time.      Current Outpatient Prescriptions on File Prior to Visit   Medication Sig    amiodarone (PACERONE) 200 MG Tab take 1 tablet by mouth once daily    aspirin 81 MG Chew Take 1 tablet (81 mg total) by mouth once daily.    carvedilol (COREG) 12.5 MG tablet take 1 tablet by mouth twice a day    lancets (ACCU-CHEK SOFTCLIX LANCETS) Misc 1 lancet by Misc.(Non-Drug; Combo Route) route 4 (four) times daily. Lancets for Accu-Chek Winter (Patient taking differently: 1 lancet by Misc.(Non-Drug; Combo Route) route once daily. Lancets for Accu-Chek Winter)    lisinopril 10 MG tablet take 2 tablets by mouth once daily    metformin (GLUCOPHAGE) 500 MG tablet take 1 tablet by mouth twice a day with meals    multivitamin-Ca-iron-minerals 27-0.4 mg Tab Take 1 tablet by mouth once daily.     pantoprazole (PROTONIX) 40 MG tablet take 1 tablet by mouth once daily    pravastatin (PRAVACHOL) 40 MG tablet take 1 tablet by mouth at bedtime for cholesterol     No current facility-administered medications on file prior to visit.      Echo 9/20/16  CONCLUSIONS     1 - Mildly to moderately depressed left ventricular systolic function (EF 40-45%).     2 - Normal right ventricular systolic function .     3 - Left ventricular diastolic dysfunction GRADE I.     4 - The estimated PA systolic pressure is greater than 23 mmHg.        Assessment and Plan:   #HTN - BP elevated in clinic today. States that she does not check it at home, but when she checks it at [facility], it is within the normal range  - Continue current medications at this time  - BP log at home and call after 1-2  weeks of readings  - Can increase Lisinopril to 40mg PO Daily if hypertensive on home readings    #NICM  - Unknown cause. Last known EF 40-45%. Down from prior. No evidence of CHF clinicaly  - Continue coreg 12.5mg PO BID, HR 59  - Continue Lisinopril 20mg PO Daily  - Repeat echo in September of this year     #History of VT arrest - ICD in place. Follows with Dr. Marla Duarte  - Continue amiodarone 200mg PO Daily  - Coreg as above    Patient discussed and examined with attending physician, Dr. Holden.    Signed:    Sander Torrez MD  Cardiology Fellow, PGY-5  Pager: [phone]  4/24/2017 9:23 AM        PAST MEDICAL HISTORY:  HTN (hypertension)

## 2023-11-17 NOTE — ED PROVIDER NOTE - CLINICAL SUMMARY MEDICAL DECISION MAKING FREE TEXT BOX
VSS, no signs of cellulitis.  DVT study negative.  DC home.  Strict return instructions discussed.  Wound care discussed.

## 2023-11-17 NOTE — ED PROVIDER NOTE - PHYSICAL EXAMINATION
_  CONSTITUTIONAL: In no acute distress  SKIN: Warm, dry  HEAD: NCAT  EYES: Clear conjunctiva   ENT: Moist mucous membranes  NECK: Supple  CARD: Regular rate and rhythm, no cyanosis  RESP: Speaking in full sentences; symmetric rise and fall of chest  EXT: Bilateral lower extremity edema, left greater than right, left lower extremity with venous stasis dermatitis excluding the foot, DP pulses palpable distally, intact range of motion at ankles and knees, no crepitus, no tenderness, no fluctuance, no induration, no open wounds  NEURO: Awake, alert  PSYCH: Cooperative, appropriate

## 2023-11-17 NOTE — ED PROVIDER NOTE - NSFOLLOWUPINSTRUCTIONS_ED_ALL_ED_FT
Please wear either compression stockings or Ace bandage around her legs to decrease swelling.  Please elevate your legs, preferably above the heart rate when sitting down or lying down.      Please follow-up with VASCULAR SURGERY. Our Emergency Department Referral Coordinators will be reaching out to you in the next 24-48 hours (during business hours) from 9:00am to 5:00pm with a follow up appointment(s). Please expect a phone call from the hospital in that time frame. If you do not receive a call or if you have any questions or concerns, you can reach them at [phone].  ------------------------------------------------------------------------------------------------------------------------  Leg Edema    WHAT YOU NEED TO KNOW:    Leg edema is swelling caused by fluid buildup. Your legs may swell if you sit or stand for long periods of time, are pregnant, or are injured. Swelling may also occur if you have heart failure or circulation problems. This means that your heart does not pump blood through your body as it should.      DISCHARGE INSTRUCTIONS:    Call your local emergency number (911 in the [place]) for any of the following:   - You cannot walk.  - You have chest pain or trouble breathing that is worse when you lie down.  - You suddenly feel lightheaded and have trouble breathing.  - You have new and sudden chest pain. You may have more pain when you take deep breaths or cough.  - You cough up blood.    Return to the emergency department if:   - You feel faint or confused.  - Your skin turns blue or gray.  - Your leg feels warm, tender, and painful. It may be swollen and red.    Call your doctor if:   - You have a fever or feel more tired than usual.  - The veins in your legs look larger than usual. They may look full or bulging.  - Your legs itch or feel heavy.  - You have red or white areas or sores on your legs. The skin may also appear dimpled or have indentations.  - You are gaining weight.  - You have trouble moving your ankles.  - The swelling does not go away, or other parts of your body swell.  - You have questions or concerns about your condition or care.      Self-care:     - Elevate your legs. Raise your legs above the level of your heart as often as you can. This will help decrease swelling and pain. Prop your legs on pillows or blankets to keep them elevated comfortably.     - Wear pressure stockings, if directed. These tight stockings put pressure on your legs to promote blood flow and prevent blood clots. Put them on before you get out of bed. Wear the stockings during the day. Do not wear them while you sleep.    - Stay active. Do not stand or sit for long periods of time. Ask your healthcare provider about the best exercise plan for you.    - Eat healthy foods. Healthy foods include fruits, vegetables, whole-grain breads, low-fat dairy products, beans, lean meats, and fish. Ask if you need to be on a special diet.  Healthy Foods     - Limit sodium (salt). Salt will make your body hold even more fluid. Your healthcare provider will tell you how many milligrams (mg) of salt you can have each day.      Follow up with your doctor as directed: Write down your questions so you remember to ask them during your visits.

## 2023-11-17 NOTE — ED ADULT NURSE NOTE - NSFALLUNIVINTERV_ED_ALL_ED
Bed/Stretcher in lowest position, wheels locked, appropriate side rails in place/Call bell, personal items and telephone in reach/Instruct patient to call for assistance before getting out of bed/chair/stretcher/Non-slip footwear applied when patient is off stretcher/[place] to call system/Physically safe environment - no spills, clutter or unnecessary equipment/Purposeful proactive rounding/Room/bathroom lighting operational, light cord in reach

## 2023-11-17 NOTE — ED PROVIDER NOTE - PATIENT PORTAL LINK FT
You can access the FollowMyHealth Patient Portal offered by [facility] by registering at the following website: [email or web address]. By joining [facility]’s FollowMyHealth portal, you will also be able to view your health information using other applications (apps) compatible with our system.

## 2023-11-22 DIAGNOSIS — R94.39 ABNORMAL RESULT OF OTHER CARDIOVASCULAR FUNCTION STUDY: ICD-10-CM

## 2023-11-29 ENCOUNTER — NON-APPOINTMENT (OUTPATIENT)
Age: 68
End: 2023-11-29

## 2023-12-05 ENCOUNTER — APPOINTMENT (OUTPATIENT)
Dept: VASCULAR SURGERY | Facility: CLINIC | Age: 68
End: 2023-12-05
Payer: MEDICARE

## 2023-12-05 VITALS
WEIGHT: 273 LBS | SYSTOLIC BLOOD PRESSURE: 177 MMHG | HEIGHT: 66 IN | BODY MASS INDEX: 43.87 KG/M2 | HEART RATE: 113 BPM | DIASTOLIC BLOOD PRESSURE: 98 MMHG

## 2023-12-05 DIAGNOSIS — L03.116 CELLULITIS OF LEFT LOWER LIMB: ICD-10-CM

## 2023-12-05 PROCEDURE — 99213 OFFICE O/P EST LOW 20 MIN: CPT | Mod: 25

## 2023-12-05 PROCEDURE — 29580 STRAPPING UNNA BOOT: CPT | Mod: LT

## 2023-12-06 RX ORDER — CEPHALEXIN 500 MG/1
500 CAPSULE ORAL 3 TIMES DAILY
Qty: 30 | Refills: 0 | Status: ACTIVE | COMMUNITY
Start: 2023-12-06 | End: 1900-01-01

## 2023-12-07 VITALS
DIASTOLIC BLOOD PRESSURE: 79 MMHG | SYSTOLIC BLOOD PRESSURE: 137 MMHG | OXYGEN SATURATION: 97 % | WEIGHT: 281.09 LBS | RESPIRATION RATE: 16 BRPM | HEART RATE: 92 BPM | HEIGHT: 66 IN

## 2023-12-07 NOTE — H&P CARDIOLOGY - HISTORY OF PRESENT ILLNESS
Patient is here for [facility]:    Patient with PHM: HTN, DM, Gout, Venous insufficiency, LUCAS. Patients reports one episode of chest pressure a few month ago which lasted a few minutes and was relieved with rest.      The patient is a 68-year-old male with a history of lower extremity swelling. The patient states 2 weeks ago he presented to the emergency department with left leg swelling and weeping edema. The patient had a venous duplex scan performed that showed no evidence of DVT. Today the patient presents complaining of burning to his left lower extremity as well as weeping edema.      Active Problems  Abnormal stress test (794.39) (R94.39)  Burning pain (780.96) (R52)  Chest pain (786.50) (R07.9)  Chronic venous insufficiency (459.81) (I87.2)  Diabetic polyneuropathy associated with diabetes mellitus due to underlying condition  (249.60,357.2) (E08.42)  Left lumbar radiculopathy (724.4) (M54.16)  Leg swelling (729.81) (M79.89)  Morbid obesity (278.01) (E66.01)  Pain in both feet (729.5) (M79.671,M79.672)  Pain in both lower extremities (729.5) (M79.604,M79.605)  Type 2 diabetes mellitus with hyperglycemia (250.00) (E11.65)  Venous insufficiency (chronic) (peripheral) (459.81) (I87.2)          Past Medical History  History of diabetes mellitus (V12.29) (Z86.39)  History of glaucoma (V12.49) (Z86.69)  History of gout (V12.29) (Z87.39)  History of hypertension (V12.59) (Z86.79)  History of morbid obesity (V13.89) (Z86.39)  History of neuropathy (V12.49) (Z86.69)  History of sleep apnea (V13.89) (Z86.69)         Allergies    TETANUS    Physical Exam       Left medial malleolus ulcer 1 cm x 1.5 cm in size positive erythema and warmth around the area.      Pre cath note:    indication:      [ ] STEMI                [ ] NSTEMI                 [ ] Acute coronary syndrome                         [ ]Unstable Angina   [ ] high risk  [ ] intermediate risk  [ ] low risk                         [ x] Stable Angina     non-invasive testing:  + NST                        Date: 11.20.23                     result: [ ] high risk  [x ] intermediate risk  [ ] low risk    Anti- Anginal medications:                        [ ] not used                       [ ] used:  [ ]CCB  [ ] BB  [ ] Nitrate [ ] Ranexa                [ ] not used but strong indication not to use    Ejection Fraction                       [ ] <29            [ ] 30-39%   [x ] 40-49%     [ ]>50%    CHF                       [ ] active (within last 14 days on meds   [ ] Chronic (on meds but no exacerbation)    COPD                        [ ] mild (on chronic bronchodilators)  [ ] moderate (on chronic steroid therapy)      [ ] severe (indication for home O2 or PACO2 >50)    Other risk factors:                         [ ] Previous MI                       [ ] CVA/ stroke                      [ ] carotid stent/ CEA                      [ ] PVD/PAD- (arterial aneurysm, non-palpable pulses, tortuous vessel with inability to insert catheter, infra-renal dissection, renal or subclavian artery stenosis)                      [ x] diabetic                      [ ] previous CABG                      [ ] Renal Failure         Adjusted CathPCI Bleeding Event Risk:   %    RIGHT RADIAL ARTERY EVALUATION:  YANG TEST: [ ] Negative          [ ] Positive    IVF: 250cc NS bolus pre-cath hydration [ ]         Patient is here for [facility]:    Patient with PHM: HTN, DM, Gout, Venous insufficiency, LUCAS. Patients reports one episode of chest pressure a few month ago which lasted a few minutes and was relieved with rest.      The patient is a 68-year-old male with a history of lower extremity swelling. The patient states 2 weeks ago he presented to the emergency department with left leg swelling and weeping edema. The patient had a venous duplex scan performed that showed no evidence of DVT. Today the patient presents complaining of burning to his left lower extremity as well as weeping edema.      Active Problems  Abnormal stress test (794.39) (R94.39)  Burning pain (780.96) (R52)  Chest pain (786.50) (R07.9)  Chronic venous insufficiency (459.81) (I87.2)  Diabetic polyneuropathy associated with diabetes mellitus due to underlying condition  (249.60,357.2) (E08.42)  Left lumbar radiculopathy (724.4) (M54.16)  Leg swelling (729.81) (M79.89)  Morbid obesity (278.01) (E66.01)  Pain in both feet (729.5) (M79.671,M79.672)  Pain in both lower extremities (729.5) (M79.604,M79.605)  Type 2 diabetes mellitus with hyperglycemia (250.00) (E11.65)  Venous insufficiency (chronic) (peripheral) (459.81) (I87.2)          Past Medical History  History of diabetes mellitus (V12.29) (Z86.39)  History of glaucoma (V12.49) (Z86.69)  History of gout (V12.29) (Z87.39)  History of hypertension (V12.59) (Z86.79)  History of morbid obesity (V13.89) (Z86.39)  History of neuropathy (V12.49) (Z86.69)  History of sleep apnea (V13.89) (Z86.69)         Allergies    TETANUS    Physical Exam       Left medial malleolus ulcer 1 cm x 1.5 cm in size positive erythema and warmth around the area.      Pre cath note:    indication:      [ ] STEMI                [ ] NSTEMI                 [ ] Acute coronary syndrome                         [ ]Unstable Angina   [ ] high risk  [ ] intermediate risk  [ ] low risk                         [ x] Stable Angina     non-invasive testing:  + NST                        Date: 11.20.23                     result: [ ] high risk  [x ] intermediate risk  [ ] low risk    Anti- Anginal medications:                        [ ] not used                       [ ] used:  [ ]CCB  [ ] BB  [ ] Nitrate [ ] Ranexa                [ ] not used but strong indication not to use    Ejection Fraction                       [ ] <29            [ ] 30-39%   [x ] 40-49%     [ ]>50%    CHF                       [ ] active (within last 14 days on meds   [ ] Chronic (on meds but no exacerbation)    COPD                        [ ] mild (on chronic bronchodilators)  [ ] moderate (on chronic steroid therapy)      [ ] severe (indication for home O2 or PACO2 >50)    Other risk factors:                         [ ] Previous MI                       [ ] CVA/ stroke                      [ ] carotid stent/ CEA                      [ ] PVD/PAD- (arterial aneurysm, non-palpable pulses, tortuous vessel with inability to insert catheter, infra-renal dissection, renal or subclavian artery stenosis)                      [ x] diabetic                      [ ] previous CABG                      [ ] Renal Failure         Adjusted CathPCI Bleeding Event Risk:   %    RIGHT RADIAL ARTERY EVALUATION:  YANG TEST: [ ] Negative          [ ] Positive    IVF: 250cc NS bolus pre-cath hydration [ ]         Patient is here for [facility]:    Patient with PHM: HTN, DM, Gout, Venous insufficiency, LUCAS. Patients reports one episode of chest pressure a few month ago which lasted a few minutes and was relieved with rest.      The patient is a 68-year-old male with a history of lower extremity swelling. The patient states 2 weeks ago he presented to the emergency department with left leg swelling and weeping edema. The patient had a venous duplex scan performed that showed no evidence of DVT. Today the patient presents complaining of burning to his left lower extremity as well as weeping edema.      Active Problems  Abnormal stress test (794.39) (R94.39)  Burning pain (780.96) (R52)  Chest pain (786.50) (R07.9)  Chronic venous insufficiency (459.81) (I87.2)  Diabetic polyneuropathy associated with diabetes mellitus due to underlying condition  (249.60,357.2) (E08.42)  Left lumbar radiculopathy (724.4) (M54.16)  Leg swelling (729.81) (M79.89)  Morbid obesity (278.01) (E66.01)  Pain in both feet (729.5) (M79.671,M79.672)  Pain in both lower extremities (729.5) (M79.604,M79.605)  Type 2 diabetes mellitus with hyperglycemia (250.00) (E11.65)  Venous insufficiency (chronic) (peripheral) (459.81) (I87.2)          Past Medical History  History of diabetes mellitus (V12.29) (Z86.39)  History of glaucoma (V12.49) (Z86.69)  History of gout (V12.29) (Z87.39)  History of hypertension (V12.59) (Z86.79)  History of morbid obesity (V13.89) (Z86.39)  History of neuropathy (V12.49) (Z86.69)  History of sleep apnea (V13.89) (Z86.69)    A1C 7.1 2023       Allergies    TETANUS    Physical Exam       Left medial malleolus ulcer 1 cm x 1.5 cm in size positive erythema and warmth around the area.      Pre cath note:    indication:      [ ] STEMI                [ ] NSTEMI                 [ ] Acute coronary syndrome                         [ ]Unstable Angina   [ ] high risk  [ ] intermediate risk  [ ] low risk                         [ x] Stable Angina     non-invasive testing:  + NST                        Date: 11.20.23                     result: [ ] high risk  [x ] intermediate risk  [ ] low risk    Anti- Anginal medications:                        [ ] not used                       [x ] used:  [x ]CCB  [x ] BB  [ ] Nitrate [ ] Ranexa                [ ] not used but strong indication not to use    Ejection Fraction                       [ ] <29            [ ] 30-39%   [x ] 40-49%     [ ]>50%    CHF                       [ ] active (within last 14 days on meds   [ ] Chronic (on meds but no exacerbation)    COPD                        [ ] mild (on chronic bronchodilators)  [ ] moderate (on chronic steroid therapy)      [ ] severe (indication for home O2 or PACO2 >50)    Other risk factors:                         [ ] Previous MI                       [ ] CVA/ stroke                      [ ] carotid stent/ CEA                      [ ] PVD/PAD- (arterial aneurysm, non-palpable pulses, tortuous vessel with inability to insert catheter, infra-renal dissection, renal or subclavian artery stenosis)                      [ x] diabetic                      [ ] previous CABG                      [ ] Renal Failure         Adjusted CathPCI Bleeding Event Risk:   %    RIGHT RADIAL ARTERY EVALUATION:  YANG TEST: [ ] Negative          [x ] Positive    IVF: 250cc NS bolus pre-cath hydration [ x]     at 9: 35am     Patient is here for [facility]:    Patient with PHM: HTN, DM, Gout, Venous insufficiency, LUCAS. Patients reports one episode of chest pressure a few month ago which lasted a few minutes and was relieved with rest.      The patient is a 68-year-old male with a history of lower extremity swelling. The patient states 2 weeks ago he presented to the emergency department with left leg swelling and weeping edema. The patient had a venous duplex scan performed that showed no evidence of DVT. Today the patient presents complaining of burning to his left lower extremity as well as weeping edema.      Active Problems  Abnormal stress test (794.39) (R94.39)  Burning pain (780.96) (R52)  Chest pain (786.50) (R07.9)  Chronic venous insufficiency (459.81) (I87.2)  Diabetic polyneuropathy associated with diabetes mellitus due to underlying condition  (249.60,357.2) (E08.42)  Left lumbar radiculopathy (724.4) (M54.16)  Leg swelling (729.81) (M79.89)  Morbid obesity (278.01) (E66.01)  Pain in both feet (729.5) (M79.671,M79.672)  Pain in both lower extremities (729.5) (M79.604,M79.605)  Type 2 diabetes mellitus with hyperglycemia (250.00) (E11.65)  Venous insufficiency (chronic) (peripheral) (459.81) (I87.2)          Past Medical History  History of diabetes mellitus (V12.29) (Z86.39)  History of glaucoma (V12.49) (Z86.69)  History of gout (V12.29) (Z87.39)  History of hypertension (V12.59) (Z86.79)  History of morbid obesity (V13.89) (Z86.39)  History of neuropathy (V12.49) (Z86.69)  History of sleep apnea (V13.89) (Z86.69)    A1C 7.1 2023       Allergies    TETANUS    Physical Exam       Left medial malleolus ulcer 1 cm x 1.5 cm in size positive erythema and warmth around the area.      Pre cath note:    indication:      [ ] STEMI                [ ] NSTEMI                 [ ] Acute coronary syndrome                         [ ]Unstable Angina   [ ] high risk  [ ] intermediate risk  [ ] low risk                         [ x] Stable Angina     non-invasive testing:  + NST                        Date: 11.20.23                     result: [ ] high risk  [x ] intermediate risk  [ ] low risk    Anti- Anginal medications:                        [ ] not used                       [x ] used:  [x ]CCB  [x ] BB  [ ] Nitrate [ ] Ranexa                [ ] not used but strong indication not to use    Ejection Fraction                       [ ] <29            [ ] 30-39%   [x ] 40-49%     [ ]>50%    CHF                       [ ] active (within last 14 days on meds   [ ] Chronic (on meds but no exacerbation)    COPD                        [ ] mild (on chronic bronchodilators)  [ ] moderate (on chronic steroid therapy)      [ ] severe (indication for home O2 or PACO2 >50)    Other risk factors:                         [ ] Previous MI                       [ ] CVA/ stroke                      [ ] carotid stent/ CEA                      [ ] PVD/PAD- (arterial aneurysm, non-palpable pulses, tortuous vessel with inability to insert catheter, infra-renal dissection, renal or subclavian artery stenosis)                      [ x] diabetic                      [ ] previous CABG                      [ ] Renal Failure         Adjusted CathPCI Bleeding Event Risk:   %    RIGHT RADIAL ARTERY EVALUATION:  YANG TEST: [ ] Negative          [x ] Positive    IVF: 250cc NS bolus pre-cath hydration [ x]     at 9: 35am     Patient is here for [facility]:    Patient with PHM: HTN, DM, Gout, Venous insufficiency, LUCAS. Patients reports one episode of chest pressure a few month ago which lasted a few minutes and was relieved with rest.      The patient is a 68-year-old male with a history of lower extremity swelling. The patient states 2 weeks ago he presented to the emergency department with left leg swelling and weeping edema. The patient had a venous duplex scan performed that showed no evidence of DVT. Today the patient presents complaining of burning to his left lower extremity as well as weeping edema.      Active Problems  Abnormal stress test (794.39) (R94.39)  Burning pain (780.96) (R52)  Chest pain (786.50) (R07.9)  Chronic venous insufficiency (459.81) (I87.2)  Diabetic polyneuropathy associated with diabetes mellitus due to underlying condition  (249.60,357.2) (E08.42)  Left lumbar radiculopathy (724.4) (M54.16)  Leg swelling (729.81) (M79.89)  Morbid obesity (278.01) (E66.01)  Pain in both feet (729.5) (M79.671,M79.672)  Pain in both lower extremities (729.5) (M79.604,M79.605)  Type 2 diabetes mellitus with hyperglycemia (250.00) (E11.65)  Venous insufficiency (chronic) (peripheral) (459.81) (I87.2)          Past Medical History  History of diabetes mellitus (V12.29) (Z86.39)  History of glaucoma (V12.49) (Z86.69)  History of gout (V12.29) (Z87.39)  History of hypertension (V12.59) (Z86.79)  History of morbid obesity (V13.89) (Z86.39)  History of neuropathy (V12.49) (Z86.69)  History of sleep apnea (V13.89) (Z86.69)    A1C 7.1 2023       Allergies    TETANUS    Physical Exam       Left medial malleolus ulcer 1 cm x 1.5 cm in size positive erythema and warmth around the area.      Pre cath note:    indication:      [ ] STEMI                [ ] NSTEMI                 [ ] Acute coronary syndrome                         [ ]Unstable Angina   [ ] high risk  [ ] intermediate risk  [ ] low risk                         [ x] Stable Angina     non-invasive testing:  + NST                        Date: 11.20.23                     result: [ ] high risk  [x ] intermediate risk  [ ] low risk    Anti- Anginal medications:                        [ ] not used                       [x ] used:  [x ]CCB  [x ] BB  [ ] Nitrate [ ] Ranexa                [ ] not used but strong indication not to use    Ejection Fraction                       [ ] <29            [ ] 30-39%   [x ] 40-49%     [ ]>50%    CHF                       [ ] active (within last 14 days on meds   [ ] Chronic (on meds but no exacerbation)    COPD                        [ ] mild (on chronic bronchodilators)  [ ] moderate (on chronic steroid therapy)      [ ] severe (indication for home O2 or PACO2 >50)    Other risk factors:                         [ ] Previous MI                       [ ] CVA/ stroke                      [ ] carotid stent/ CEA                      [ ] PVD/PAD- (arterial aneurysm, non-palpable pulses, tortuous vessel with inability to insert catheter, infra-renal dissection, renal or subclavian artery stenosis)                      [ x] diabetic                      [ ] previous CABG                      [ ] Renal Failure         Adjusted CathPCI Bleeding Event Risk: 0.8 %    RIGHT RADIAL ARTERY EVALUATION:  YANG TEST: [ ] Negative          [x ] Positive    IVF: 250cc NS bolus pre-cath hydration [ x]     at 9: 35am     Patient is here for [facility]:    Patient with PHM: HTN, DM, Gout, Venous insufficiency, LUCAS. Patients reports one episode of chest pressure a few month ago which lasted a few minutes and was relieved with rest.      The patient is a 68-year-old male with a history of lower extremity swelling. The patient states 2 weeks ago he presented to the emergency department with left leg swelling and weeping edema. The patient had a venous duplex scan performed that showed no evidence of DVT. Today the patient presents complaining of burning to his left lower extremity as well as weeping edema.      Active Problems  Abnormal stress test (794.39) (R94.39)  Burning pain (780.96) (R52)  Chest pain (786.50) (R07.9)  Chronic venous insufficiency (459.81) (I87.2)  Diabetic polyneuropathy associated with diabetes mellitus due to underlying condition  (249.60,357.2) (E08.42)  Left lumbar radiculopathy (724.4) (M54.16)  Leg swelling (729.81) (M79.89)  Morbid obesity (278.01) (E66.01)  Pain in both feet (729.5) (M79.671,M79.672)  Pain in both lower extremities (729.5) (M79.604,M79.605)  Type 2 diabetes mellitus with hyperglycemia (250.00) (E11.65)  Venous insufficiency (chronic) (peripheral) (459.81) (I87.2)          Past Medical History  History of diabetes mellitus (V12.29) (Z86.39)  History of glaucoma (V12.49) (Z86.69)  History of gout (V12.29) (Z87.39)  History of hypertension (V12.59) (Z86.79)  History of morbid obesity (V13.89) (Z86.39)  History of neuropathy (V12.49) (Z86.69)  History of sleep apnea (V13.89) (Z86.69)    A1C 7.1 2023       Allergies    TETANUS    Physical Exam       Left medial malleolus ulcer 1 cm x 1.5 cm in size positive erythema and warmth around the area.      Pre cath note:    indication:      [ ] STEMI                [ ] NSTEMI                 [ ] Acute coronary syndrome                         [ ]Unstable Angina   [ ] high risk  [ ] intermediate risk  [ ] low risk                         [ x] Stable Angina     non-invasive testing:  + NST                        Date: 11.20.23                     result: [ ] high risk  [x ] intermediate risk  [ ] low risk    Anti- Anginal medications:                        [ ] not used                       [x ] used:  [x ]CCB  [x ] BB  [ ] Nitrate [ ] Ranexa                [ ] not used but strong indication not to use    Ejection Fraction                       [ ] <29            [ ] 30-39%   [x ] 40-49%     [ ]>50%    CHF                       [ ] active (within last 14 days on meds   [ ] Chronic (on meds but no exacerbation)    COPD                        [ ] mild (on chronic bronchodilators)  [ ] moderate (on chronic steroid therapy)      [ ] severe (indication for home O2 or PACO2 >50)    Other risk factors:                         [ ] Previous MI                       [ ] CVA/ stroke                      [ ] carotid stent/ CEA                      [ ] PVD/PAD- (arterial aneurysm, non-palpable pulses, tortuous vessel with inability to insert catheter, infra-renal dissection, renal or subclavian artery stenosis)                      [ x] diabetic                      [ ] previous CABG                      [ ] Renal Failure         Adjusted CathPCI Bleeding Event Risk: 0.8 %    RIGHT RADIAL ARTERY EVALUATION:  YANG TEST: [ ] Negative          [x ] Positive    IVF: 250cc NS bolus pre-cath hydration [ x]     at 9: 35am     Patient is here for [facility]:    Patient with PHM: HTN, DM, Gout, Venous insufficiency, LUCAS. Patients reports one episode of chest pressure a few month ago which lasted a few minutes and was relieved with rest.      cardiac workup mildly reduced EF and abnormal NST, here today for [facility]  Active Problems  Abnormal stress test (794.39) (R94.39)  Burning pain (780.96) (R52)  Chest pain (786.50) (R07.9)  Chronic venous insufficiency (459.81) (I87.2)  Diabetic polyneuropathy associated with diabetes mellitus due to underlying condition  (249.60,357.2) (E08.42)  Left lumbar radiculopathy (724.4) (M54.16)  Leg swelling (729.81) (M79.89)  Morbid obesity (278.01) (E66.01)  Pain in both feet (729.5) (M79.671,M79.672)  Pain in both lower extremities (729.5) (M79.604,M79.605)  Type 2 diabetes mellitus with hyperglycemia (250.00) (E11.65)  Venous insufficiency (chronic) (peripheral) (459.81) (I87.2)          Past Medical History  History of diabetes mellitus (V12.29) (Z86.39)  History of glaucoma (V12.49) (Z86.69)  History of gout (V12.29) (Z87.39)  History of hypertension (V12.59) (Z86.79)  History of morbid obesity (V13.89) (Z86.39)  History of neuropathy (V12.49) (Z86.69)  History of sleep apnea (V13.89) (Z86.69)    A1C 7.1 2023       Allergies    TETANUS    Physical Exam       Left medial malleolus ulcer 1 cm x 1.5 cm in size positive erythema and warmth around the area.      Pre cath note:    indication:      [ ] STEMI                [ ] NSTEMI                 [ ] Acute coronary syndrome                         [ ]Unstable Angina   [ ] high risk  [ ] intermediate risk  [ ] low risk                         [ x] Stable Angina     non-invasive testing:  + NST                        Date: 11.20.23                     result: [ ] high risk  [x ] intermediate risk  [ ] low risk    Anti- Anginal medications:                        [ ] not used                       [x ] used:  [x ]CCB  [x ] BB  [ ] Nitrate [ ] Ranexa                [ ] not used but strong indication not to use    Ejection Fraction                       [ ] <29            [ ] 30-39%   [x ] 40-49%     [ ]>50%    CHF                       [ ] active (within last 14 days on meds   [ ] Chronic (on meds but no exacerbation)    COPD                        [ ] mild (on chronic bronchodilators)  [ ] moderate (on chronic steroid therapy)      [ ] severe (indication for home O2 or PACO2 >50)    Other risk factors:                         [ ] Previous MI                       [ ] CVA/ stroke                      [ ] carotid stent/ CEA                      [ ] PVD/PAD- (arterial aneurysm, non-palpable pulses, tortuous vessel with inability to insert catheter, infra-renal dissection, renal or subclavian artery stenosis)                      [ x] diabetic                      [ ] previous CABG                      [ ] Renal Failure         Adjusted CathPCI Bleeding Event Risk: 0.8 %    RIGHT RADIAL ARTERY EVALUATION:  YANG TEST: [ ] Negative          [x ] Positive    IVF: 250cc NS bolus pre-cath hydration [ x]     at 9: 35am     Patient is here for [facility]:    Patient with PHM: HTN, DM, Gout, Venous insufficiency, LUCAS. Patients reports one episode of chest pressure a few month ago which lasted a few minutes and was relieved with rest.      cardiac workup mildly reduced EF and abnormal NST, here today for [facility]  Active Problems  Abnormal stress test (794.39) (R94.39)  Burning pain (780.96) (R52)  Chest pain (786.50) (R07.9)  Chronic venous insufficiency (459.81) (I87.2)  Diabetic polyneuropathy associated with diabetes mellitus due to underlying condition  (249.60,357.2) (E08.42)  Left lumbar radiculopathy (724.4) (M54.16)  Leg swelling (729.81) (M79.89)  Morbid obesity (278.01) (E66.01)  Pain in both feet (729.5) (M79.671,M79.672)  Pain in both lower extremities (729.5) (M79.604,M79.605)  Type 2 diabetes mellitus with hyperglycemia (250.00) (E11.65)  Venous insufficiency (chronic) (peripheral) (459.81) (I87.2)          Past Medical History  History of diabetes mellitus (V12.29) (Z86.39)  History of glaucoma (V12.49) (Z86.69)  History of gout (V12.29) (Z87.39)  History of hypertension (V12.59) (Z86.79)  History of morbid obesity (V13.89) (Z86.39)  History of neuropathy (V12.49) (Z86.69)  History of sleep apnea (V13.89) (Z86.69)    A1C 7.1 2023       Allergies    TETANUS    Physical Exam       Left medial malleolus ulcer 1 cm x 1.5 cm in size positive erythema and warmth around the area.      Pre cath note:    indication:      [ ] STEMI                [ ] NSTEMI                 [ ] Acute coronary syndrome                         [ ]Unstable Angina   [ ] high risk  [ ] intermediate risk  [ ] low risk                         [ x] Stable Angina     non-invasive testing:  + NST                        Date: 11.20.23                     result: [ ] high risk  [x ] intermediate risk  [ ] low risk    Anti- Anginal medications:                        [ ] not used                       [x ] used:  [x ]CCB  [x ] BB  [ ] Nitrate [ ] Ranexa                [ ] not used but strong indication not to use    Ejection Fraction                       [ ] <29            [ ] 30-39%   [x ] 40-49%     [ ]>50%    CHF                       [ ] active (within last 14 days on meds   [ ] Chronic (on meds but no exacerbation)    COPD                        [ ] mild (on chronic bronchodilators)  [ ] moderate (on chronic steroid therapy)      [ ] severe (indication for home O2 or PACO2 >50)    Other risk factors:                         [ ] Previous MI                       [ ] CVA/ stroke                      [ ] carotid stent/ CEA                      [ ] PVD/PAD- (arterial aneurysm, non-palpable pulses, tortuous vessel with inability to insert catheter, infra-renal dissection, renal or subclavian artery stenosis)                      [ x] diabetic                      [ ] previous CABG                      [ ] Renal Failure         Adjusted CathPCI Bleeding Event Risk: 0.8 %    RIGHT RADIAL ARTERY EVALUATION:  YANG TEST: [ ] Negative          [x ] Positive    IVF: 250cc NS bolus pre-cath hydration [ x]     at 9: 35am

## 2023-12-07 NOTE — H&P CARDIOLOGY - VASCULAR DETAILS
edema b/l LE  LLE with chronic venous skin changes and indurated edema  dressing is on till Tuesday 12.12.2023 as per MD

## 2023-12-08 ENCOUNTER — OUTPATIENT (OUTPATIENT)
Dept: OUTPATIENT SERVICES | Facility: HOSPITAL | Age: 68
LOS: 1 days | Discharge: HOME | End: 2023-12-08
Payer: MEDICARE

## 2023-12-08 ENCOUNTER — TRANSCRIPTION ENCOUNTER (OUTPATIENT)
Age: 68
End: 2023-12-08

## 2023-12-08 VITALS
SYSTOLIC BLOOD PRESSURE: 143 MMHG | DIASTOLIC BLOOD PRESSURE: 86 MMHG | RESPIRATION RATE: 15 BRPM | OXYGEN SATURATION: 96 % | HEART RATE: 80 BPM

## 2023-12-08 DIAGNOSIS — I25.10 ATHEROSCLEROTIC HEART DISEASE OF NATIVE CORONARY ARTERY WITHOUT ANGINA PECTORIS: ICD-10-CM

## 2023-12-08 DIAGNOSIS — I42.9 CARDIOMYOPATHY, UNSPECIFIED: ICD-10-CM

## 2023-12-08 DIAGNOSIS — I10 ESSENTIAL (PRIMARY) HYPERTENSION: ICD-10-CM

## 2023-12-08 DIAGNOSIS — E78.5 HYPERLIPIDEMIA, UNSPECIFIED: ICD-10-CM

## 2023-12-08 DIAGNOSIS — R94.39 ABNORMAL RESULT OF OTHER CARDIOVASCULAR FUNCTION STUDY: ICD-10-CM

## 2023-12-08 DIAGNOSIS — Z98.49 CATARACT EXTRACTION STATUS, UNSPECIFIED EYE: Chronic | ICD-10-CM

## 2023-12-08 DIAGNOSIS — Z98.890 OTHER SPECIFIED POSTPROCEDURAL STATES: Chronic | ICD-10-CM

## 2023-12-08 DIAGNOSIS — I50.89 OTHER HEART FAILURE: ICD-10-CM

## 2023-12-08 LAB
ANION GAP SERPL CALC-SCNC: 12 MMOL/L — SIGNIFICANT CHANGE UP (ref 7–14)
ANION GAP SERPL CALC-SCNC: 12 MMOL/L — SIGNIFICANT CHANGE UP (ref 7–14)
BUN SERPL-MCNC: 10 MG/DL — SIGNIFICANT CHANGE UP (ref 10–20)
BUN SERPL-MCNC: 10 MG/DL — SIGNIFICANT CHANGE UP (ref 10–20)
CALCIUM SERPL-MCNC: 9.5 MG/DL — SIGNIFICANT CHANGE UP (ref 8.4–10.4)
CALCIUM SERPL-MCNC: 9.5 MG/DL — SIGNIFICANT CHANGE UP (ref 8.4–10.4)
CHLORIDE SERPL-SCNC: 98 MMOL/L — SIGNIFICANT CHANGE UP (ref 98–110)
CHLORIDE SERPL-SCNC: 98 MMOL/L — SIGNIFICANT CHANGE UP (ref 98–110)
CO2 SERPL-SCNC: 27 MMOL/L — SIGNIFICANT CHANGE UP (ref 17–32)
CO2 SERPL-SCNC: 27 MMOL/L — SIGNIFICANT CHANGE UP (ref 17–32)
CREAT SERPL-MCNC: 0.8 MG/DL — SIGNIFICANT CHANGE UP (ref 0.7–1.5)
CREAT SERPL-MCNC: 0.8 MG/DL — SIGNIFICANT CHANGE UP (ref 0.7–1.5)
EGFR: 96 ML/MIN/1.73M2 — SIGNIFICANT CHANGE UP
EGFR: 96 ML/MIN/1.73M2 — SIGNIFICANT CHANGE UP
GLUCOSE BLDC GLUCOMTR-MCNC: 172 MG/DL — HIGH (ref 70–99)
GLUCOSE BLDC GLUCOMTR-MCNC: 172 MG/DL — HIGH (ref 70–99)
GLUCOSE SERPL-MCNC: 204 MG/DL — HIGH (ref 70–99)
GLUCOSE SERPL-MCNC: 204 MG/DL — HIGH (ref 70–99)
HCT VFR BLD CALC: 42.8 % — SIGNIFICANT CHANGE UP (ref 42–52)
HCT VFR BLD CALC: 42.8 % — SIGNIFICANT CHANGE UP (ref 42–52)
HGB BLD-MCNC: 13.8 G/DL — LOW (ref 14–18)
HGB BLD-MCNC: 13.8 G/DL — LOW (ref 14–18)
MCHC RBC-ENTMCNC: 28.1 PG — SIGNIFICANT CHANGE UP (ref 27–31)
MCHC RBC-ENTMCNC: 28.1 PG — SIGNIFICANT CHANGE UP (ref 27–31)
MCHC RBC-ENTMCNC: 32.2 G/DL — SIGNIFICANT CHANGE UP (ref 32–37)
MCHC RBC-ENTMCNC: 32.2 G/DL — SIGNIFICANT CHANGE UP (ref 32–37)
MCV RBC AUTO: 87.2 FL — SIGNIFICANT CHANGE UP (ref 80–94)
MCV RBC AUTO: 87.2 FL — SIGNIFICANT CHANGE UP (ref 80–94)
NRBC # BLD: 0 /100 WBCS — SIGNIFICANT CHANGE UP (ref 0–0)
NRBC # BLD: 0 /100 WBCS — SIGNIFICANT CHANGE UP (ref 0–0)
PLATELET # BLD AUTO: 206 K/UL — SIGNIFICANT CHANGE UP (ref 130–400)
PLATELET # BLD AUTO: 206 K/UL — SIGNIFICANT CHANGE UP (ref 130–400)
PMV BLD: 11.9 FL — HIGH (ref 7.4–10.4)
PMV BLD: 11.9 FL — HIGH (ref 7.4–10.4)
POTASSIUM SERPL-MCNC: 4.6 MMOL/L — SIGNIFICANT CHANGE UP (ref 3.5–5)
POTASSIUM SERPL-MCNC: 4.6 MMOL/L — SIGNIFICANT CHANGE UP (ref 3.5–5)
POTASSIUM SERPL-SCNC: 4.6 MMOL/L — SIGNIFICANT CHANGE UP (ref 3.5–5)
POTASSIUM SERPL-SCNC: 4.6 MMOL/L — SIGNIFICANT CHANGE UP (ref 3.5–5)
RBC # BLD: 4.91 M/UL — SIGNIFICANT CHANGE UP (ref 4.7–6.1)
RBC # BLD: 4.91 M/UL — SIGNIFICANT CHANGE UP (ref 4.7–6.1)
RBC # FLD: 14.9 % — HIGH (ref 11.5–14.5)
RBC # FLD: 14.9 % — HIGH (ref 11.5–14.5)
SODIUM SERPL-SCNC: 137 MMOL/L — SIGNIFICANT CHANGE UP (ref 135–146)
SODIUM SERPL-SCNC: 137 MMOL/L — SIGNIFICANT CHANGE UP (ref 135–146)
WBC # BLD: 7.84 K/UL — SIGNIFICANT CHANGE UP (ref 4.8–10.8)
WBC # BLD: 7.84 K/UL — SIGNIFICANT CHANGE UP (ref 4.8–10.8)
WBC # FLD AUTO: 7.84 K/UL — SIGNIFICANT CHANGE UP (ref 4.8–10.8)
WBC # FLD AUTO: 7.84 K/UL — SIGNIFICANT CHANGE UP (ref 4.8–10.8)

## 2023-12-08 PROCEDURE — 93458 L HRT ARTERY/VENTRICLE ANGIO: CPT | Mod: 26

## 2023-12-08 PROCEDURE — 93010 ELECTROCARDIOGRAM REPORT: CPT

## 2023-12-08 PROCEDURE — 82962 GLUCOSE BLOOD TEST: CPT

## 2023-12-08 PROCEDURE — 93005 ELECTROCARDIOGRAM TRACING: CPT | Mod: XU

## 2023-12-08 PROCEDURE — C1769: CPT

## 2023-12-08 PROCEDURE — 93458 L HRT ARTERY/VENTRICLE ANGIO: CPT

## 2023-12-08 PROCEDURE — 85027 COMPLETE CBC AUTOMATED: CPT

## 2023-12-08 PROCEDURE — 80048 BASIC METABOLIC PNL TOTAL CA: CPT

## 2023-12-08 PROCEDURE — 36415 COLL VENOUS BLD VENIPUNCTURE: CPT

## 2023-12-08 PROCEDURE — C1887: CPT

## 2023-12-08 PROCEDURE — C1894: CPT

## 2023-12-08 RX ORDER — METOPROLOL TARTRATE 50 MG
12.5 TABLET ORAL ONCE
Refills: 0 | Status: COMPLETED | OUTPATIENT
Start: 2023-12-08 | End: 2023-12-08

## 2023-12-08 RX ORDER — ASPIRIN/CALCIUM CARB/MAGNESIUM 324 MG
81 TABLET ORAL ONCE
Refills: 0 | Status: COMPLETED | OUTPATIENT
Start: 2023-12-08 | End: 2023-12-08

## 2023-12-08 RX ORDER — CHLORHEXIDINE GLUCONATE 213 G/1000ML
1 SOLUTION TOPICAL ONCE
Refills: 0 | Status: DISCONTINUED | OUTPATIENT
Start: 2023-12-08 | End: 2023-12-08

## 2023-12-08 RX ORDER — AMLODIPINE BESYLATE 2.5 MG/1
2.5 TABLET ORAL ONCE
Refills: 0 | Status: COMPLETED | OUTPATIENT
Start: 2023-12-08 | End: 2023-12-08

## 2023-12-08 RX ORDER — SODIUM CHLORIDE 9 MG/ML
1000 INJECTION INTRAMUSCULAR; INTRAVENOUS; SUBCUTANEOUS
Refills: 0 | Status: DISCONTINUED | OUTPATIENT
Start: 2023-12-08 | End: 2023-12-08

## 2023-12-08 RX ORDER — SODIUM CHLORIDE 9 MG/ML
250 INJECTION INTRAMUSCULAR; INTRAVENOUS; SUBCUTANEOUS ONCE
Refills: 0 | Status: COMPLETED | OUTPATIENT
Start: 2023-12-08 | End: 2023-12-08

## 2023-12-08 RX ADMIN — AMLODIPINE BESYLATE 2.5 MILLIGRAM(S): 2.5 TABLET ORAL at 09:59

## 2023-12-08 RX ADMIN — SODIUM CHLORIDE 250 MILLILITER(S): 9 INJECTION INTRAMUSCULAR; INTRAVENOUS; SUBCUTANEOUS at 09:57

## 2023-12-08 RX ADMIN — Medication 12.5 MILLIGRAM(S): at 09:59

## 2023-12-08 RX ADMIN — Medication 81 MILLIGRAM(S): at 10:24

## 2023-12-08 NOTE — ASU PATIENT PROFILE, ADULT - FALL HARM RISK - RISK INTERVENTIONS
Assistance OOB with selected safe patient handling equipment/Assistance with ambulation/Communicate Fall Risk and Risk Factors to all staff, patient, and family/Discuss with provider need for PT consult/Monitor gait and stability/Provide patient with walking aids - walker, cane, crutches/Reinforce activity limits and safety measures with patient and family/Visual Cue: Yellow wristband/Bed in lowest position, wheels locked, appropriate side rails in place/Call bell, personal items and telephone in reach/Instruct patient to call for assistance before getting out of bed or chair/Non-slip footwear when patient is out of bed/[place] to call system/Physically safe environment - no spills, clutter or unnecessary equipment/Purposeful Proactive Rounding/Room/bathroom lighting operational, light cord in reach Assistance OOB with selected safe patient handling equipment/Assistance with ambulation/Communicate Fall Risk and Risk Factors to all staff, patient, and family/Discuss with provider need for PT consult/Monitor gait and stability/Provide patient with walking aids - walker, cane, crutches/Reinforce activity limits and safety measures with patient and family/Visual Cue: Yellow wristband/Bed in lowest position, wheels locked, appropriate side rails in place/Call bell, personal items and telephone in reach/Instruct patient to call for assistance before getting out of bed or chair/Non-slip footwear when patient is out of bed/[place] to call system/Physically safe environment - no spills, clutter or unnecessary equipment/Purposeful Proactive Rounding/Room/bathroom lighting operational, light cord in reach

## 2023-12-08 NOTE — ASU DISCHARGE PLAN (ADULT/PEDIATRIC) - CARE PROVIDER_API CALL
Ulises Guerrero  Cardiology  [address] [place] [id number]  Phone: [phone]  Fax: [phone]  Established Patient  Follow Up Time: 2 weeks   Ulises Guerrero  Cardiology  [address] [place] [id number]  Phone: [phone]  Fax: [phone]  Established Patient  Follow Up Time: 2 weeks

## 2023-12-08 NOTE — ASU DISCHARGE PLAN (ADULT/PEDIATRIC) - NS MD DC FALL RISK RISK
For information on Fall & Injury Prevention, visit: [email or web address] OR  [email or web address] OR  https://www.cdc.gov/steadi/patient.html For information on Fall & Injury Prevention, visit: [email or web address] OR  [email or web address] OR  https://www.cdc.gov/steadi/patient.html

## 2023-12-08 NOTE — ASU DISCHARGE PLAN (ADULT/PEDIATRIC) - PROVIDER TOKENS
PROVIDER:[TOKEN:[[id number]],FOLLOWUP:[2 weeks],ESTABLISHEDPATIENT:[T]] PROVIDER:[TOKEN:[[id number]],FOLLOWUP:[2 weeks],ESTABLISHEDPATIENT:[T]]

## 2023-12-08 NOTE — ASU DISCHARGE PLAN (ADULT/PEDIATRIC) - ASU DC SPECIAL INSTRUCTIONSFT
Discharge Instructions as follows:  - Continue medical regimen as prescribed to prevent chest pain.  - If you are diabetic and taking medication containing Metformin, do not take them for 48 hours after the procedure  - Instructed to call 911 if chest pain, shortness of breath or bleeding from access site.  - No heavy lifting > 10lbs x 1 week.  - No driving x 24 hours.  - No baths, swimming pools x 1 week, may shower  - Low sodium low fat low cholesterol diet  - Follow-up with Cardiologist in 1-2 weeks after discharge  - Soreness or tenderness at the site is possible, it will diminish over time. You may take Tylenol every 4-6 hours as needed. Nothing stronger is needed. NO Motrin/Ibuprofen  - Any questions call cardiac cath lab [phone] Discharge Instructions as follows:  - Continue medical regimen as prescribed to prevent chest pain.  - If you are diabetic and taking medication containing Metformin, do not take them for 48 hours after the procedure  - Instructed to call 911 if chest pain, shortness of breath or bleeding from access site.  - No heavy lifting > 10lbs x 1 week.  - No driving x 24 hours.  - No baths, swimming pools x 1 week, may shower  - Low sodium low fat low cholesterol diet  - Follow-up with Cardiologist in 1-2 weeks after discharge  - Soreness or tenderness at the site is possible, it will diminish over time. You may take Tylenol every 4-6 hours as needed. Nothing stronger is needed. NO Motrin/Ibuprofen  - Any questions call cardiac cath lab [phone]

## 2023-12-12 ENCOUNTER — APPOINTMENT (OUTPATIENT)
Dept: VASCULAR SURGERY | Facility: CLINIC | Age: 68
End: 2023-12-12
Payer: MEDICARE

## 2023-12-12 PROBLEM — E11.9 TYPE 2 DIABETES MELLITUS WITHOUT COMPLICATIONS: Chronic | Status: ACTIVE | Noted: 2023-12-08

## 2023-12-12 PROBLEM — L03.90 CELLULITIS, UNSPECIFIED: Chronic | Status: ACTIVE | Noted: 2023-12-08

## 2023-12-12 PROCEDURE — 29580 STRAPPING UNNA BOOT: CPT | Mod: LT

## 2023-12-13 ENCOUNTER — APPOINTMENT (OUTPATIENT)
Dept: CARDIOLOGY | Facility: CLINIC | Age: 68
End: 2023-12-13
Payer: MEDICARE

## 2023-12-13 VITALS — HEART RATE: 105 BPM | DIASTOLIC BLOOD PRESSURE: 84 MMHG | SYSTOLIC BLOOD PRESSURE: 140 MMHG

## 2023-12-13 VITALS — TEMPERATURE: 98 F | WEIGHT: 289 LBS | BODY MASS INDEX: 46.45 KG/M2 | HEIGHT: 66 IN

## 2023-12-13 DIAGNOSIS — I50.22 CHRONIC SYSTOLIC (CONGESTIVE) HEART FAILURE: ICD-10-CM

## 2023-12-13 DIAGNOSIS — L97.321 VARICOSE VEINS OF LEFT LOWER EXTREMITY WITH ULCER OF ANKLE: ICD-10-CM

## 2023-12-13 DIAGNOSIS — I87.2 VENOUS INSUFFICIENCY (CHRONIC) (PERIPHERAL): ICD-10-CM

## 2023-12-13 DIAGNOSIS — I25.10 ATHEROSCLEROTIC HEART DISEASE OF NATIVE CORONARY ARTERY W/OUT ANGINA PECTORIS: ICD-10-CM

## 2023-12-13 DIAGNOSIS — I83.023 VARICOSE VEINS OF LEFT LOWER EXTREMITY WITH ULCER OF ANKLE: ICD-10-CM

## 2023-12-13 PROCEDURE — 99214 OFFICE O/P EST MOD 30 MIN: CPT

## 2023-12-13 PROCEDURE — 93000 ELECTROCARDIOGRAM COMPLETE: CPT

## 2023-12-13 RX ORDER — SPIRONOLACTONE 25 MG/1
25 TABLET ORAL
Qty: 90 | Refills: 3 | Status: ACTIVE | COMMUNITY
Start: 2023-12-13 | End: 1900-01-01

## 2023-12-13 RX ORDER — ROSUVASTATIN CALCIUM 5 MG/1
TABLET, FILM COATED ORAL DAILY
Refills: 0 | Status: ACTIVE | COMMUNITY

## 2023-12-13 RX ORDER — METFORMIN HYDROCHLORIDE 625 MG/1
TABLET ORAL
Refills: 0 | Status: ACTIVE | COMMUNITY

## 2023-12-13 RX ORDER — METOPROLOL SUCCINATE 50 MG/1
50 TABLET, EXTENDED RELEASE ORAL DAILY
Qty: 90 | Refills: 1 | Status: ACTIVE | COMMUNITY
Start: 2023-12-13 | End: 1900-01-01

## 2023-12-13 NOTE — ASSESSMENT
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 Possible tachy induced -Will start GDMT.  -Start Toprol 50mg PO daily and spironolactone 25mg PO daily.  -Check labs in 1-2 weeks.  -Continue irbesartan. Will likely change to Entresto in future.  -Check CMR for infiltrative disease.  DM type II: HA1c 7.1%. - follow with endocrinology - continue Metformin - will start Rosuvastatin 20 mg PO daily - Lipid profile  HTN: BP at goal per ACC/AHA 2018 guidelines - continue Irbesartan - low sodium diet  Follow up in 6 weeks.

## 2023-12-13 NOTE — HISTORY OF PRESENT ILLNESS
[FreeTextEntry1] : Mr. Chowdhury is a 69yo M with PMHx of HTN, DM, gout, venous insufficiency, LUCAS who presents to establish care. His PMD is Dr. Jeremiah Florence referred him to cardiology for evaluation. Patient reports one episode of chest pressure a few months ago which lasted a few minutes and was relieved with rest. He was seen by cardiology more than 20 years ago. Denies palpitations, dyspnea, syncope. Has Hx of chronic back pain from lumbar radiculopathy which limits his mobility.  12/13/23-Patient found to have depressed EF and went for LHC which did not show obstructive CAD. He is also being treated for LLE cellulitis.

## 2023-12-19 ENCOUNTER — APPOINTMENT (OUTPATIENT)
Dept: VASCULAR SURGERY | Facility: CLINIC | Age: 68
End: 2023-12-19
Payer: MEDICARE

## 2023-12-19 PROCEDURE — 99212 OFFICE O/P EST SF 10 MIN: CPT

## 2023-12-19 NOTE — ASSESSMENT
[FreeTextEntry1] : 67 y/o M with venous insufficiency, LLE venous ulcer now healed well on unna boot therapy.  Advised on use of emollient and compression daily.  Follow up as needed.

## 2023-12-27 ENCOUNTER — APPOINTMENT (OUTPATIENT)
Dept: NEUROLOGY | Facility: CLINIC | Age: 68
End: 2023-12-27
Payer: MEDICARE

## 2023-12-27 VITALS — HEIGHT: 66 IN | BODY MASS INDEX: 46.45 KG/M2 | WEIGHT: 289 LBS

## 2023-12-27 DIAGNOSIS — E08.42 DIABETES MELLITUS DUE TO UNDERLYING CONDITION WITH DIABETIC POLYNEUROPATHY: ICD-10-CM

## 2023-12-27 DIAGNOSIS — I87.2 VENOUS INSUFFICIENCY (CHRONIC) (PERIPHERAL): ICD-10-CM

## 2023-12-27 PROCEDURE — 99214 OFFICE O/P EST MOD 30 MIN: CPT

## 2023-12-27 NOTE — REVIEW OF SYSTEMS
[Numbness] : numbness [Tingling] : tingling [Abnormal Sensation] : an abnormal sensation [Difficulty Walking] : difficulty walking [Arthralgias] : arthralgias [Limb Swelling] : limb swelling [Skin Lesions] : skin lesion [Skin Wound] : skin wound [de-identified] : limping gait tending to drag the left leg  [de-identified] : Left anterior shin and calf extremely red, swollen and hot with increased pain x 2 weeks

## 2023-12-27 NOTE — HISTORY OF PRESENT ILLNESS
[FreeTextEntry1] : Original Presentation : Mr. Chowdhury is a 68-year-old male who presents today with a history of chronic diabetes and obesity. He complains of symptoms of polyneuropathy and left lumbar radiculopathy. He was operated on 1/3/2023 at [facility] in [place], [place] for symptoms of left sided sciatica. This improved his walking which was very impaired due to pain from the left lumbar region. He did have burning sensations in both lower extremities which only improved slightly in the right leg on gabapentin 400 mg TID. He still has burning sensation in the medial aspect of the left leg to the left ankle. Patient also has swelling in the left lower leg and pigmentation/discoloration which he has had chronically. His A1C hemoglobin has improved from 7.7 to 7.1.   Today : Today I had the pleasure of seeing  Mr. Chowdhury   in our office for follow up.  The patients previous history and physical findings have been reviewed.     Mr. Chowdhury remains under our care for diabetic polyneuropathy, morbid obesity, and left lumbar radiculopathy, chronic conditions for which he is receiving active treatment for. At his previous visit his Gabapentin was increased to 600mg 1 tablet QID, a today he reports persistent lower extremity neuropathy worse on the left. He has participated in physical therapy for his legs an back in the past and is willing to restart. Additionally, patient has hx of Lumbar fusion preformed in [place] on 1.3.23  and reports persistent low back pain that travels down his legs as well. No imaging available of the lumbar spine. Of note patient is currently under the care of a vascular specialist for chronic stasis ulcers notably to his LLE, he was last seen by vascular 12.19.23 and patient LLE wound was noted to be healing with siomara boot therapy and emollient  and compression daily s/p antibiotics for cellulitis infection. Since his last visit he saw his cardiologist on 12.13.23 was started on Metoprolol, spironolactone, statin therapy and is pending Lab work and cardiac imaging.

## 2023-12-27 NOTE — ASSESSMENT
[FreeTextEntry1] : 68 year old male with diabetic polyneuropathy, morbid obesity, and left lumbar radiculopathy, PVD, all chronic conditions for which he is receiving active treatment.  For his neuropathy and radicular symptoms, he will continue Gabapentin 600mg QID and will resume physical therapy for his legs and low back. I have also ordered an MRI of the Lumbar spine. Once imaging is completed he will return to the office for follow up.

## 2023-12-27 NOTE — PHYSICAL EXAM
[General Appearance - Alert] : alert [General Appearance - In No Acute Distress] : in no acute distress [Oriented To Time, Place, And Person] : oriented to person, place, and time [Impaired Insight] : insight and judgment were intact [Affect] : the affect was normal [Person] : oriented to person [Place] : oriented to place [Time] : oriented to time [Concentration Intact] : normal concentrating ability [Visual Intact] : visual attention was ~T not ~L decreased [Naming Objects] : no difficulty naming common objects [Repeating Phrases] : no difficulty repeating a phrase [Writing A Sentence] : no difficulty writing a sentence [Fluency] : fluency intact [Comprehension] : comprehension intact [Reading] : reading intact [Past History] : adequate knowledge of personal past history [Cranial Nerves Optic (II)] : visual acuity intact bilaterally,  visual fields full to confrontation, pupils equal round and reactive to light [Cranial Nerves Oculomotor (III)] : extraocular motion intact [Cranial Nerves Trigeminal (V)] : facial sensation intact symmetrically [Cranial Nerves Facial (VII)] : face symmetrical [Cranial Nerves Vestibulocochlear (VIII)] : hearing was intact bilaterally [Cranial Nerves Glossopharyngeal (IX)] : tongue and palate midline [Cranial Nerves Accessory (XI - Cranial And Spinal)] : head turning and shoulder shrug symmetric [Cranial Nerves Hypoglossal (XII)] : there was no tongue deviation with protrusion [Motor Tone] : muscle tone was normal in all four extremities [Motor Strength] : muscle strength was normal in all four extremities [Sensation Tactile Decrease] : light touch was intact [Dysesthesia] : dysesthesia was present [Abnormal Walk] : normal gait [Balance] : balance was intact [2+] : Ankle jerk left 2+ [PERRL With Normal Accommodation] : pupils were equal in size, round, reactive to light, with normal accommodation [Neck Appearance] : the appearance of the neck was normal [Involuntary Movements] : no involuntary movements were seen [Past-pointing] : there was no past-pointing [Tremor] : no tremor present [Plantar Reflex Right Only] : normal on the right [Plantar Reflex Left Only] : normal on the left [FreeTextEntry6] : UE 5/5 b/l  LE 4-/5  [FreeTextEntry1] : LLE extremelyred tender hot and swollen

## 2024-01-06 NOTE — ASU DISCHARGE PLAN (ADULT/PEDIATRIC) - D. IF YOU HAD ANY TYPE OF SEDATION, YOU MAY EXPERIENCE LIGHTHEADEDNESS, DIZZINESS, OR SLEEPINESS FOLLOWING YOUR PROCEDURE. A RESPONSIBLE ADULT SHOULD STAY WITH YOU FOR AT LEAST 24 HOURS FOLLOWING YOUR PROCEDURE.
E.J. Noble Statement Selected

## 2024-01-23 ENCOUNTER — APPOINTMENT (OUTPATIENT)
Dept: ENDOCRINOLOGY | Facility: CLINIC | Age: 69
End: 2024-01-23

## 2024-01-24 ENCOUNTER — APPOINTMENT (OUTPATIENT)
Dept: CARDIOLOGY | Facility: CLINIC | Age: 69
End: 2024-01-24

## 2024-01-31 ENCOUNTER — APPOINTMENT (OUTPATIENT)
Dept: NEUROLOGY | Facility: CLINIC | Age: 69
End: 2024-01-31

## 2024-02-02 ENCOUNTER — TRANSCRIPTION ENCOUNTER (OUTPATIENT)
Age: 69
End: 2024-02-02

## 2024-02-02 ENCOUNTER — RESULT REVIEW (OUTPATIENT)
Age: 69
End: 2024-02-02

## 2024-02-02 ENCOUNTER — OUTPATIENT (OUTPATIENT)
Dept: OUTPATIENT SERVICES | Facility: HOSPITAL | Age: 69
LOS: 1 days | Discharge: HOME | End: 2024-02-02
Payer: MEDICARE

## 2024-02-02 VITALS
RESPIRATION RATE: 18 BRPM | WEIGHT: 272.93 LBS | HEART RATE: 96 BPM | SYSTOLIC BLOOD PRESSURE: 133 MMHG | DIASTOLIC BLOOD PRESSURE: 85 MMHG | HEIGHT: 66 IN | TEMPERATURE: 98 F

## 2024-02-02 VITALS
TEMPERATURE: 97 F | SYSTOLIC BLOOD PRESSURE: 141 MMHG | DIASTOLIC BLOOD PRESSURE: 80 MMHG | RESPIRATION RATE: 21 BRPM | HEART RATE: 85 BPM | OXYGEN SATURATION: 95 %

## 2024-02-02 DIAGNOSIS — Z98.890 OTHER SPECIFIED POSTPROCEDURAL STATES: Chronic | ICD-10-CM

## 2024-02-02 DIAGNOSIS — Z98.49 CATARACT EXTRACTION STATUS, UNSPECIFIED EYE: Chronic | ICD-10-CM

## 2024-02-02 DIAGNOSIS — Z12.11 ENCOUNTER FOR SCREENING FOR MALIGNANT NEOPLASM OF COLON: ICD-10-CM

## 2024-02-02 PROCEDURE — 88305 TISSUE EXAM BY PATHOLOGIST: CPT

## 2024-02-02 PROCEDURE — 45385 COLONOSCOPY W/LESION REMOVAL: CPT

## 2024-02-02 PROCEDURE — 88305 TISSUE EXAM BY PATHOLOGIST: CPT | Mod: 26

## 2024-02-02 PROCEDURE — 45380 COLONOSCOPY AND BIOPSY: CPT | Mod: XU

## 2024-02-02 RX ORDER — ONDANSETRON 8 MG/1
4 TABLET, FILM COATED ORAL ONCE
Refills: 0 | Status: DISCONTINUED | OUTPATIENT
Start: 2024-02-02 | End: 2024-02-02

## 2024-02-02 RX ORDER — COLCHICINE 0.6 MG
0 TABLET ORAL
Refills: 0 | DISCHARGE

## 2024-02-02 RX ORDER — ASPIRIN/CALCIUM CARB/MAGNESIUM 324 MG
1 TABLET ORAL
Refills: 0 | DISCHARGE

## 2024-02-02 RX ORDER — METFORMIN HYDROCHLORIDE 850 MG/1
1 TABLET ORAL
Refills: 0 | DISCHARGE

## 2024-02-02 RX ORDER — SODIUM CHLORIDE 9 MG/ML
500 INJECTION INTRAMUSCULAR; INTRAVENOUS; SUBCUTANEOUS
Refills: 0 | Status: DISCONTINUED | OUTPATIENT
Start: 2024-02-02 | End: 2024-02-02

## 2024-02-02 RX ORDER — IRBESARTAN 75 MG/1
1 TABLET ORAL
Refills: 0 | DISCHARGE

## 2024-02-02 RX ORDER — LATANOPROST 0.05 MG/ML
1 SOLUTION/ DROPS OPHTHALMIC; TOPICAL
Refills: 0 | DISCHARGE

## 2024-02-02 RX ORDER — DORZOLAMIDE HYDROCHLORIDE 20 MG/ML
1 SOLUTION/ DROPS OPHTHALMIC
Refills: 0 | DISCHARGE

## 2024-02-02 RX ORDER — ALLOPURINOL 300 MG
0 TABLET ORAL
Refills: 0 | DISCHARGE

## 2024-02-02 RX ORDER — CEPHALEXIN 500 MG
1 CAPSULE ORAL
Refills: 0 | DISCHARGE

## 2024-02-02 RX ORDER — ROSUVASTATIN CALCIUM 5 MG/1
1 TABLET ORAL
Refills: 0 | DISCHARGE

## 2024-02-02 NOTE — ASU PATIENT PROFILE, ADULT - NUMBER OF YRS
History: Patient was recently prescribed amoxicillin for bilateral otitis media; currently on amoxicillin, no SE, no diarrhea, rashes  He has a history of recurrent otitis media infections and had been advised to see ENT especially because patient is suspected to have some speech delay   assessment: established condition   Plan: finish the amoxicillin and schedule ENT appt in 2-3 weeks. Monitor and if sx return RTC or call office and will give different antibiotic  
History: per mom pt started with congestion, cough initially dry and then with phlegm, no fevers, wheezing or ear pulling. No covid exposures. Does go to . Sx started on 09/04/2021 9/8/2021 ICC consult: Mom brought patient in for nasal congestion and cough for 3 days diagnosed with acute otitis media bilateral and was treated with amoxicillin and also tested positive for RSV  Assessment: established condition   Plan: Reviewed ICC note, labs with patient's mom, negative Covid test.  Patient is improving. Advised to continue with cool mist humidifier and will be able to return to school on Monday without restrictions.  No antibiotic or other treatment needed for RSV at this time.  
30

## 2024-02-02 NOTE — H&P PST ADULT - ASSESSMENT
68-year-old male average risk for CRC, personal hx of colon polyps, DM, HTN, LUCAS on CPAP, presents for surveillance colonoscopy.

## 2024-02-02 NOTE — CHART NOTE - NSCHARTNOTEFT_GEN_A_CORE
PACU ANESTHESIA PACU ADMISSION NOTE      Procedure:  Post op diagnosis    ____ Intubated  TV:______       Rate: ______      FiO2: ______    ___x_ Patent Airway    ____ Full return of protective reflexes    ____ Full recovery from anesthesia / sedation to baseline status    Viitals:  see anesthesia record            Mental Status:  _x___ Awake   _____ Alert   _____ Drowsy   _____ Sedated    Nausea/Vomiting: ____ Yes, See Post - Op Orders      x____ No    Pain Scale (0-10): _____    Treatment: ____ None    ___x_ See Post - Op/PCA Orders    Post - Operative Fluids:   ____ Oral   _x___ See Post - Op Orders    Plan:         Discharge:   _x___Home       _____Floor         _____Critical Care    _____Other:_________________    Comments: uneventful perioperative course, no s/s of anesthesia complications noted; D/C home yony criteria met

## 2024-02-02 NOTE — ASU PATIENT PROFILE, ADULT - FALL HARM RISK - RISK INTERVENTIONS
Assistance OOB with selected safe patient handling equipment/Assistance with ambulation/Communicate Fall Risk and Risk Factors to all staff, patient, and family/Discuss with provider need for PT consult/Monitor for mental status changes/Monitor gait and stability/Provide patient with walking aids - walker, cane, crutches/Reinforce activity limits and safety measures with patient and family/Toileting schedule using arm’s reach rule for commode and bathroom/Use of alarms - bed, chair and/or voice tab/Visual Cue: Yellow wristband/Bed in lowest position, wheels locked, appropriate side rails in place/Call bell, personal items and telephone in reach/Instruct patient to call for assistance before getting out of bed or chair/Non-slip footwear when patient is out of bed/Dawn to call system/Physically safe environment - no spills, clutter or unnecessary equipment/Purposeful Proactive Rounding/Room/bathroom lighting operational, light cord in reach

## 2024-02-02 NOTE — ASU DISCHARGE PLAN (ADULT/PEDIATRIC) - CALL YOUR DOCTOR IF YOU HAVE ANY OF THE FOLLOWING:
Bleeding that does not stop/Fever greater than (need to indicate Fahrenheit or Celsius)/Nausea and vomiting that does not stop/Excessive diarrhea/Inability to tolerate liquids or foods Bleeding that does not stop/Pain not relieved by Medications/Fever greater than (need to indicate Fahrenheit or Celsius)/Nausea and vomiting that does not stop/Excessive diarrhea/Inability to tolerate liquids or foods

## 2024-02-02 NOTE — ASU DISCHARGE PLAN (ADULT/PEDIATRIC) - CARE PROVIDER_API CALL
Sharee Olson  Gastroenterology  [address] [place] [address]  Phone: [phone]  Fax: [phone]  Established Patient  Follow Up Time: Routine

## 2024-02-05 ENCOUNTER — APPOINTMENT (OUTPATIENT)
Dept: MRI IMAGING | Facility: CLINIC | Age: 69
End: 2024-02-05
Payer: MEDICARE

## 2024-02-05 PROCEDURE — 72148 MRI LUMBAR SPINE W/O DYE: CPT | Mod: MH

## 2024-02-07 LAB — SURGICAL PATHOLOGY STUDY: SIGNIFICANT CHANGE UP

## 2024-02-08 DIAGNOSIS — Z86.010 PERSONAL HISTORY OF COLONIC POLYPS: ICD-10-CM

## 2024-02-08 DIAGNOSIS — E11.9 TYPE 2 DIABETES MELLITUS WITHOUT COMPLICATIONS: ICD-10-CM

## 2024-02-08 DIAGNOSIS — Z79.84 LONG TERM (CURRENT) USE OF ORAL HYPOGLYCEMIC DRUGS: ICD-10-CM

## 2024-02-08 DIAGNOSIS — E66.9 OBESITY, UNSPECIFIED: ICD-10-CM

## 2024-02-08 DIAGNOSIS — G47.33 OBSTRUCTIVE SLEEP APNEA (ADULT) (PEDIATRIC): ICD-10-CM

## 2024-02-08 DIAGNOSIS — Z12.11 ENCOUNTER FOR SCREENING FOR MALIGNANT NEOPLASM OF COLON: ICD-10-CM

## 2024-02-08 DIAGNOSIS — K63.9 DISEASE OF INTESTINE, UNSPECIFIED: ICD-10-CM

## 2024-02-08 DIAGNOSIS — D12.0 BENIGN NEOPLASM OF CECUM: ICD-10-CM

## 2024-02-08 DIAGNOSIS — Z79.82 LONG TERM (CURRENT) USE OF ASPIRIN: ICD-10-CM

## 2024-02-08 DIAGNOSIS — I10 ESSENTIAL (PRIMARY) HYPERTENSION: ICD-10-CM

## 2024-02-08 DIAGNOSIS — Z88.7 ALLERGY STATUS TO SERUM AND VACCINE: ICD-10-CM

## 2024-03-01 ENCOUNTER — APPOINTMENT (OUTPATIENT)
Dept: NEUROLOGY | Facility: CLINIC | Age: 69
End: 2024-03-01
Payer: MEDICARE

## 2024-03-01 VITALS
SYSTOLIC BLOOD PRESSURE: 157 MMHG | DIASTOLIC BLOOD PRESSURE: 96 MMHG | BODY MASS INDEX: 43.87 KG/M2 | WEIGHT: 273 LBS | HEIGHT: 66 IN | HEART RATE: 104 BPM

## 2024-03-01 DIAGNOSIS — M54.16 RADICULOPATHY, LUMBAR REGION: ICD-10-CM

## 2024-03-01 DIAGNOSIS — M51.16 INTERVERTEBRAL DISC DISORDERS WITH RADICULOPATHY, LUMBAR REGION: ICD-10-CM

## 2024-03-01 DIAGNOSIS — E11.65 TYPE 2 DIABETES MELLITUS WITH HYPERGLYCEMIA: ICD-10-CM

## 2024-03-01 PROCEDURE — 99214 OFFICE O/P EST MOD 30 MIN: CPT

## 2024-03-01 RX ORDER — GABAPENTIN 600 MG/1
600 TABLET, COATED ORAL 4 TIMES DAILY
Qty: 120 | Refills: 3 | Status: ACTIVE | COMMUNITY
Start: 2023-07-19 | End: 1900-01-01

## 2024-03-01 NOTE — ASSESSMENT
[FreeTextEntry1] : 68 year old male with diabetic polyneuropathy, morbid obesity, and chronic lumbar radiculopathy.  For his neuropathy and radicular symptoms, he will continue Gabapentin 600mg QID as is without change. Today we reviewed his MRI of the Lumbar spine noted above and patient was provided a referral to be seen and evaluated by pain management to discuss any potential interventions / recommendations. He will return to the office for follow up in 4 months.  Patient is aware that they may call/ contact the office at any time if they have any additional questions or concerns regarding their management. *All potential risks, benefits, side effects and interactions of any medications prescribed where discussed in detail with patient at the time of todays encounter.*

## 2024-03-01 NOTE — HISTORY OF PRESENT ILLNESS
[FreeTextEntry1] : Original Presentation : Mr. Chowdhury is a 68-year-old male who presents today with a history of chronic diabetes and obesity. He complains of symptoms of polyneuropathy and left lumbar radiculopathy. He was operated on 1/3/2023 at [facility] in [place], [place] for symptoms of left sided sciatica. This improved his walking which was very impaired due to pain from the left lumbar region. He did have burning sensations in both lower extremities which only improved slightly in the right leg on gabapentin 400 mg TID. He still has burning sensation in the medial aspect of the left leg to the left ankle. Patient also has swelling in the left lower leg and pigmentation/discoloration which he has had chronically. His A1C hemoglobin has improved from 7.7 to 7.1.   Diagnostic Testing :  MRI Lumbar Spine 2.5.24 : Small subligamentous disc herniation paracentral to the right of midline at L1-2. small diffuse disc herniation L5-S1 centrally without thecal sac compression or lumbar nerve root displacement. anterior sponylolisthesis L5-S1 corrected with pedicle screws and intervening fusion. Small annular bulges L4-5 and L3-4.   Today : Today I had the pleasure of seeing  Mr. Chowdhury   in our office for follow up.  The patients previous history and physical findings have been reviewed.     Mr. Chowdhury remains under our care for diabetic polyneuropathy, and  lumbar radiculopathy. These are chronic conditions for which he is receiving active treatment for.  Today we reviewed his MRI of the Lumbar spine noted above. He is currently on a medication regimen of  600mg 1 tablet QID and has been participating in physical therapy for his low back for the last 6 weeks. Patient has hx of Lumbar fusion preformed in [place] on 1.3.23  and reports persistent low back pain that travels down his legs into bilateral feet that has been persistent despite conservative management. Today we discussed being evaluated by pain management to discuss potential injection / intervention.

## 2024-03-01 NOTE — PHYSICAL EXAM
[General Appearance - In No Acute Distress] : in no acute distress [General Appearance - Alert] : alert [Impaired Insight] : insight and judgment were intact [Oriented To Time, Place, And Person] : oriented to person, place, and time [Affect] : the affect was normal [Place] : oriented to place [Person] : oriented to person [Time] : oriented to time [Naming Objects] : no difficulty naming common objects [Visual Intact] : visual attention was ~T not ~L decreased [Concentration Intact] : normal concentrating ability [Repeating Phrases] : no difficulty repeating a phrase [Writing A Sentence] : no difficulty writing a sentence [Fluency] : fluency intact [Comprehension] : comprehension intact [Reading] : reading intact [Past History] : adequate knowledge of personal past history [Cranial Nerves Optic (II)] : visual acuity intact bilaterally,  visual fields full to confrontation, pupils equal round and reactive to light [Cranial Nerves Oculomotor (III)] : extraocular motion intact [Cranial Nerves Trigeminal (V)] : facial sensation intact symmetrically [Cranial Nerves Facial (VII)] : face symmetrical [Cranial Nerves Vestibulocochlear (VIII)] : hearing was intact bilaterally [Cranial Nerves Glossopharyngeal (IX)] : tongue and palate midline [Cranial Nerves Hypoglossal (XII)] : there was no tongue deviation with protrusion [Cranial Nerves Accessory (XI - Cranial And Spinal)] : head turning and shoulder shrug symmetric [Motor Strength] : muscle strength was normal in all four extremities [Motor Tone] : muscle tone was normal in all four extremities [Abnormal Walk] : normal gait [Dysesthesia] : dysesthesia was present [Sensation Tactile Decrease] : light touch was intact [Balance] : balance was intact [2+] : Ankle jerk left 2+ [PERRL With Normal Accommodation] : pupils were equal in size, round, reactive to light, with normal accommodation [Neck Appearance] : the appearance of the neck was normal [Involuntary Movements] : no involuntary movements were seen [Past-pointing] : there was no past-pointing [Tremor] : no tremor present [Plantar Reflex Right Only] : normal on the right [Plantar Reflex Left Only] : normal on the left [FreeTextEntry6] : UE 5/5 b/l  LE 4-/5  [FreeTextEntry1] : LLE extremelyred tender hot and swollen

## 2024-03-01 NOTE — REVIEW OF SYSTEMS
[Numbness] : numbness [Tingling] : tingling [Abnormal Sensation] : an abnormal sensation [Difficulty Walking] : difficulty walking [Arthralgias] : arthralgias [Limb Swelling] : limb swelling [Skin Wound] : skin wound [Skin Lesions] : skin lesion [de-identified] : limping gait tending to drag the left leg  [de-identified] : Left anterior shin and calf extremely red, swollen and hot with increased pain x 2 weeks

## 2024-03-29 ENCOUNTER — APPOINTMENT (OUTPATIENT)
Dept: PAIN MANAGEMENT | Facility: CLINIC | Age: 69
End: 2024-03-29
Payer: MEDICARE

## 2024-03-29 PROCEDURE — 99214 OFFICE O/P EST MOD 30 MIN: CPT

## 2024-04-01 NOTE — DATA REVIEWED
[FreeTextEntry1] : MRI of the lumbar spine taken on 2-5-2024 showed small subligamentous disc herniation precentral to the right of midline at L1-2. Small diffuse disc herniation L5-S1 centrally without thecal sac compression or lumbar roots displacement. Anterior spondylolisthesis L5-S1 corrected with pedicle screws and intervening fusion rods. Small annular bulges L4-5 and L3-4.

## 2024-04-01 NOTE — DISCUSSION/SUMMARY
[de-identified] : A discussion regarding available pain management treatment options occurred with the patient. These included interventional, rehabilitative, pharmacological, and alternative modalities. We will proceed with the following:   Interventional treatment options: 1. Proceed with a Left L4-5, L5-S1 transforaminal epidural steroid injection with sedation.  Treatment options were discussed. The patient has been having persistent, severe low back pain and lumbar radicular pain that has minimally improved with conservative therapy thus far (including physical therapy, home stretching and anti-inflammatory medications. The patient was given the option of proceeding with a lumbar epidural steroid injection to try and get the patient some pain relief.  The risks and benefits were discussed, which included the associated problems with steroids, bleeding, nerve injury, lack of improvement with pain, and 0.5% chance of a post dural puncture headache.   The patient has severe anxiety of procedures that necessitates monitored anesthesia care (MAC). The procedure performed will be close to major nerves, arteries, and spinal cord and/or joint structures. Due to the proximity of these structures, we need the patient to be still during the procedure.  With the help of MAC, this will be safely achieved and decrease the risk of any complications.   - Follow up in 2 weeks post injection.   * Patient is also a candidate for a medial branch facet block.   I Brigida Reyez attest that this documentation has been prepared under the direction and in the presence of provider Dr. Cj Velazquez.  The documentation recorded by the scribe in my presence, accurately reflects the service I personally performed, and the decisions made by me with my edits as appropriate.   Cj Velazquez, DO

## 2024-04-01 NOTE — HISTORY OF PRESENT ILLNESS
[FreeTextEntry1] : Mr. Chowdhury is a 68-year-old male presenting to establish care for his lower back pain. He has a prior history of lumbar spine surgery that was done on 1-3-2023 at [facility] in [place], [place] for his left sided sciatica pain. He is presenting today with ongoing complaints of left sided lumbar radicular pain. He notes burning sensations in the medial aspect of the left leg to the ankle and foot. His toes feel numb and hurt. Pain is associated with sharp, shooting pains. He has trouble with getting up out of a seated position. Pain is mostly present when walking. He has also been noticing right sided pain as well. He uses a cane to ambulate for stability. Pain is present daily and on a constant basis. He has limitation of his ADLs. He denies any changes in bowel/bladder habits, fevers/chills or any recent weight loss.

## 2024-04-01 NOTE — PHYSICAL EXAM
[de-identified] :  Lumbar Spine Exam: Inspection:  erythema (-)  ecchymosis (-)  rashes (-)  alignment: no scoliosis    Palpation:  Midline lumbar tenderness:             (-)  paraspinal tenderness:                  L (+) ; R (-)  sciatic nerve tenderness :             L (+) ; R (-)  SI joint tenderness:                        L (-) ; R (-)  GTB tenderness:                            L (-);  R (-)    Limited ROM 2/2 to pain with lumbar extension   Strength: 5/5 throughout all muscle groups of the lower extremity                                                                            Right       Left     Hip Flexion:                (5/5)       (5/5)  Quadriceps:               (5/5)       (5/5)  Hamstrings:                (5/5)       (5/5)  Ankle Dorsiflexion:     (5/5)       (5/5)  EHL:                           (5/5)       (5/5)  Ankle Plantarflexion:  (5/5)       (5/5)    Special Tests:  SLR:                           R (-) ; L (+)  Facet loading:            R (-) ; L (-)  GENNARO test:               R (-) ; L (-)    Neurologic: Light touch intact throughout  LE  Reflexes normal and symmetric  Gait: non antalgic gait ambulates w/o assistive device

## 2024-04-13 ENCOUNTER — APPOINTMENT (OUTPATIENT)
Dept: PAIN MANAGEMENT | Facility: CLINIC | Age: 69
End: 2024-04-13
Payer: MEDICARE

## 2024-04-13 PROCEDURE — 64483 NJX AA&/STRD TFRM EPI L/S 1: CPT | Mod: LT

## 2024-04-13 PROCEDURE — 64484 NJX AA&/STRD TFRM EPI L/S EA: CPT | Mod: LT

## 2024-04-13 PROCEDURE — 00630 ANES PX LUMBAR REGION NOS: CPT | Mod: QZ,P2

## 2024-04-15 NOTE — PROCEDURE
[FreeTextEntry3] : Date of Service: 04/13/2024 Account: [id number] Patient: SRINI BOB YOB: 1955 Age: 68 year Surgeon:   Cj Velazquez DO Assistant:  None Pre-Operative Diagnosis:   Lumbosacral radiculopathy Post Operative Diagnosis:  Lumbosacral radiculopathy Pocedure: Left L4-L5, L5-S1 transforaminal epidural steroid injection under fluoroscopic guidance. Anesthesia:            MAC   This procedure was carried out using fluoroscopic guidance.  The risks and benefits of the procedure were discussed extensively with the patient.  The consent of the patient was obtained and the following procedure was performed. The patient was placed in the prone position on the fluoroscopy table and the area was prepped and draped in a sterile fashion.  A timeout was performed with all essential staff present and the site and side were verified.   The left L4-L5 neural foramen was then identified on right oblique "colin dog" anatomical view at the 6 o' clock position using fluoroscopic guidance, and the area was marked. A 25 gauge 3.5 inch spinal needle was directed toward the inferior (6 o'clock) position of the pedicle, which formed the roof of the identified foramen.  Once in the epidural space, after negative aspiration for heme and CSF, 1cc of Omnipaque contrast was injected to confirm epidural location and assess filling defects and rule out intravascular needle placement.   Lumbar epidurogram showed no intravascular or intrathecal flow pattern.  No blood or CSF was aspirated.  Omnipaque spread medially in epidural space and outlined the exiting nerve root.   After this, 2.5 cc of a 5cc mixture of preservative free normal saline plus 10mg of Decadron was injected in the epidural space.   The left L5-S1 neural foramen and was then identified on right oblique "colin dog" anatomical view at the 6 o' clock position using fluoroscopic guidance, and the area was marked.  A 25 gauge 3.5 inch spinal needle was directed toward the inferior (6 o'clock) position of the pedicle, which formed the roof of the identified foramen.  Once in the epidural space, after negative aspiration for heme and CSF, 1cc of Omnipaque contrast was injected to confirm epidural location and assess filling defects and rule out intravascular needle placement.   Lumbar epidurogram showed no intravascular or intrathecal flow pattern.  No blood or CSF was aspirated.  Omnipaque spread medially in epidural space and outlined the exiting nerve root.   After this, the remainder of the injectate listed above was injected in the epidural space.   The needle was subsequently removed.  Vital signs remained normal.  Pulse oximeter was used throughout the procedure and the patient's pulse and oxygen saturation remained within normal limits.  The patient tolerated the procedure well.  There were no complications.  The patient was instructed to apply ice over the injection sites for twenty minutes every two hours for the next 24 to 48 hours.   Disposition:      1. The patient was advised to F/U in 1-2 weeks to assess the response to the injection.      2. The patient was also instructed to contact me immediately if there were any concerns related to the procedure performed.

## 2024-04-24 ENCOUNTER — APPOINTMENT (OUTPATIENT)
Dept: PAIN MANAGEMENT | Facility: CLINIC | Age: 69
End: 2024-04-24
Payer: MEDICARE

## 2024-04-24 VITALS — HEIGHT: 66 IN | WEIGHT: 273 LBS | BODY MASS INDEX: 43.87 KG/M2

## 2024-04-24 DIAGNOSIS — M54.16 RADICULOPATHY, LUMBAR REGION: ICD-10-CM

## 2024-04-24 PROCEDURE — 99213 OFFICE O/P EST LOW 20 MIN: CPT

## 2024-04-24 NOTE — DISCUSSION/SUMMARY
[de-identified] : A discussion regarding available pain management treatment options occurred with the patient. These included interventional, rehabilitative, pharmacological, and alternative modalities. We will proceed with the following:   Interventional treatment options: - None indicated at this time.   - Follow up in 2 months. At that time, I will proceed with a 2nd epidural.   I Brigida Reyez attest that this documentation has been prepared under the direction and in the presence of provider Dr. Cj Velazquez.  The documentation recorded by the scribe in my presence, accurately reflects the service I personally performed, and the decisions made by me with my edits as appropriate.   Cj Velazquez, DO

## 2024-04-24 NOTE — PHYSICAL EXAM
[de-identified] :  Lumbar Spine Exam: Inspection:  erythema (-)  ecchymosis (-)  rashes (-)  alignment: no scoliosis    Palpation:  Midline lumbar tenderness:             (-)  paraspinal tenderness:                  L (+) ; R (-)  sciatic nerve tenderness :             L (+) ; R (-)  SI joint tenderness:                        L (-) ; R (-)  GTB tenderness:                            L (-);  R (-)    Limited ROM 2/2 to pain with lumbar extension   Strength: 5/5 throughout all muscle groups of the lower extremity                                                                            Right       Left     Hip Flexion:                (5/5)       (5/5)  Quadriceps:               (5/5)       (5/5)  Hamstrings:                (5/5)       (5/5)  Ankle Dorsiflexion:     (5/5)       (5/5)  EHL:                           (5/5)       (5/5)  Ankle Plantarflexion:  (5/5)       (5/5)    Special Tests:  SLR:                           R (-) ; L (+)  Facet loading:            R (-) ; L (-)  GENNARO test:               R (-) ; L (-)    Neurologic: Light touch intact throughout  LE  Reflexes normal and symmetric  Gait: non antalgic gait ambulates w/o assistive device

## 2024-04-24 NOTE — HISTORY OF PRESENT ILLNESS
[FreeTextEntry1] : Mr. Chowdhury is a 68-year-old male presenting to establish care for his lower back pain. He has a prior history of lumbar spine surgery that was done on 1-3-2023 at [facility] in [place], [place] for his left sided sciatica pain. He is presenting today with ongoing complaints of left sided lumbar radicular pain. He notes burning sensations in the medial aspect of the left leg to the ankle and foot. His toes feel numb and hurt. Pain is associated with sharp, shooting pains. He has trouble with getting up out of a seated position. Pain is mostly present when walking. He has also been noticing right sided pain as well. He uses a cane to ambulate for stability. Pain is present daily and on a constant basis. He has limitation of his ADLs. He denies any changes in bowel/bladder habits, fevers/chills or any recent weight loss.   TODAY, 4-: Revisit encounter.   Since last visit, he underwent a Left L4-5, L5-S1 transforaminal epidural steroid injection on 4-. He affords about 50% sustained relief form this procedure. He states the pain is now tolerable. He does have some residual left sided pain however he is able to deal with the symptoms. His left leg radicular pain has greatly improved.

## 2024-04-29 ENCOUNTER — RX RENEWAL (OUTPATIENT)
Age: 69
End: 2024-04-29

## 2024-04-29 RX ORDER — METFORMIN HYDROCHLORIDE 1000 MG/1
1000 TABLET, COATED ORAL TWICE DAILY
Qty: 180 | Refills: 1 | Status: ACTIVE | COMMUNITY
Start: 2023-06-30 | End: 1900-01-01

## 2024-06-27 ENCOUNTER — APPOINTMENT (OUTPATIENT)
Dept: VASCULAR SURGERY | Facility: CLINIC | Age: 69
End: 2024-06-27

## 2024-06-27 ENCOUNTER — TRANSCRIPTION ENCOUNTER (OUTPATIENT)
Age: 69
End: 2024-06-27

## 2024-06-27 VITALS
WEIGHT: 273 LBS | SYSTOLIC BLOOD PRESSURE: 141 MMHG | HEIGHT: 66 IN | BODY MASS INDEX: 43.87 KG/M2 | DIASTOLIC BLOOD PRESSURE: 73 MMHG

## 2024-06-27 DIAGNOSIS — I83.029 VARICOSE VEINS OF LEFT LOWER EXTREMITY WITH ULCER OF UNSPECIFIED SITE: ICD-10-CM

## 2024-06-27 DIAGNOSIS — M79.89 OTHER SPECIFIED SOFT TISSUE DISORDERS: ICD-10-CM

## 2024-06-27 DIAGNOSIS — M79.605 PAIN IN RIGHT LEG: ICD-10-CM

## 2024-06-27 DIAGNOSIS — L97.929 VARICOSE VEINS OF LEFT LOWER EXTREMITY WITH ULCER OF UNSPECIFIED SITE: ICD-10-CM

## 2024-06-27 DIAGNOSIS — L03.115 CELLULITIS OF RIGHT LOWER LIMB: ICD-10-CM

## 2024-06-27 DIAGNOSIS — M79.604 PAIN IN RIGHT LEG: ICD-10-CM

## 2024-06-27 PROCEDURE — 93970 EXTREMITY STUDY: CPT

## 2024-06-27 PROCEDURE — 29580 STRAPPING UNNA BOOT: CPT | Mod: LT

## 2024-07-05 ENCOUNTER — APPOINTMENT (OUTPATIENT)
Dept: VASCULAR SURGERY | Facility: CLINIC | Age: 69
End: 2024-07-05
Payer: MEDICARE

## 2024-07-05 PROCEDURE — 29580 STRAPPING UNNA BOOT: CPT | Mod: LT

## 2024-07-11 ENCOUNTER — APPOINTMENT (OUTPATIENT)
Dept: VASCULAR SURGERY | Facility: CLINIC | Age: 69
End: 2024-07-11
Payer: MEDICARE

## 2024-07-11 DIAGNOSIS — L97.929 VARICOSE VEINS OF LEFT LOWER EXTREMITY WITH ULCER OF UNSPECIFIED SITE: ICD-10-CM

## 2024-07-11 DIAGNOSIS — I83.029 VARICOSE VEINS OF LEFT LOWER EXTREMITY WITH ULCER OF UNSPECIFIED SITE: ICD-10-CM

## 2024-07-11 PROCEDURE — 99212 OFFICE O/P EST SF 10 MIN: CPT

## 2024-08-02 ENCOUNTER — APPOINTMENT (OUTPATIENT)
Dept: NEUROLOGY | Facility: CLINIC | Age: 69
End: 2024-08-02

## 2024-08-12 DIAGNOSIS — Z12.11 ENCOUNTER FOR SCREENING FOR MALIGNANT NEOPLASM OF COLON: ICD-10-CM

## 2024-08-12 RX ORDER — SODIUM SULFATE, POTASSIUM SULFATE AND MAGNESIUM SULFATE 1.6; 3.13; 17.5 G/177ML; G/177ML; G/177ML
17.5-3.13-1.6 SOLUTION ORAL
Qty: 1 | Refills: 0 | Status: ACTIVE | COMMUNITY
Start: 2024-08-12 | End: 1900-01-01

## 2024-08-16 ENCOUNTER — OUTPATIENT (OUTPATIENT)
Dept: OUTPATIENT SERVICES | Facility: HOSPITAL | Age: 69
LOS: 1 days | Discharge: HOME | End: 2024-08-16
Payer: MEDICARE

## 2024-08-16 ENCOUNTER — TRANSCRIPTION ENCOUNTER (OUTPATIENT)
Age: 69
End: 2024-08-16

## 2024-08-16 ENCOUNTER — RESULT REVIEW (OUTPATIENT)
Age: 69
End: 2024-08-16

## 2024-08-16 VITALS
DIASTOLIC BLOOD PRESSURE: 86 MMHG | WEIGHT: 302.03 LBS | HEART RATE: 88 BPM | HEIGHT: 66 IN | SYSTOLIC BLOOD PRESSURE: 146 MMHG | TEMPERATURE: 98 F

## 2024-08-16 VITALS — DIASTOLIC BLOOD PRESSURE: 80 MMHG | HEART RATE: 97 BPM | OXYGEN SATURATION: 97 % | SYSTOLIC BLOOD PRESSURE: 130 MMHG

## 2024-08-16 DIAGNOSIS — K64.4 RESIDUAL HEMORRHOIDAL SKIN TAGS: ICD-10-CM

## 2024-08-16 DIAGNOSIS — I10 ESSENTIAL (PRIMARY) HYPERTENSION: ICD-10-CM

## 2024-08-16 DIAGNOSIS — Z79.84 LONG TERM (CURRENT) USE OF ORAL HYPOGLYCEMIC DRUGS: ICD-10-CM

## 2024-08-16 DIAGNOSIS — Z79.82 LONG TERM (CURRENT) USE OF ASPIRIN: ICD-10-CM

## 2024-08-16 DIAGNOSIS — Z09 ENCOUNTER FOR FOLLOW-UP EXAMINATION AFTER COMPLETED TREATMENT FOR CONDITIONS OTHER THAN MALIGNANT NEOPLASM: ICD-10-CM

## 2024-08-16 DIAGNOSIS — Z88.7 ALLERGY STATUS TO SERUM AND VACCINE: ICD-10-CM

## 2024-08-16 DIAGNOSIS — K57.30 DIVERTICULOSIS OF LARGE INTESTINE WITHOUT PERFORATION OR ABSCESS WITHOUT BLEEDING: ICD-10-CM

## 2024-08-16 DIAGNOSIS — Z86.010 PERSONAL HISTORY OF COLONIC POLYPS: ICD-10-CM

## 2024-08-16 DIAGNOSIS — E11.9 TYPE 2 DIABETES MELLITUS WITHOUT COMPLICATIONS: ICD-10-CM

## 2024-08-16 DIAGNOSIS — Z98.49 CATARACT EXTRACTION STATUS, UNSPECIFIED EYE: Chronic | ICD-10-CM

## 2024-08-16 DIAGNOSIS — E66.01 MORBID (SEVERE) OBESITY DUE TO EXCESS CALORIES: ICD-10-CM

## 2024-08-16 DIAGNOSIS — Z12.11 ENCOUNTER FOR SCREENING FOR MALIGNANT NEOPLASM OF COLON: ICD-10-CM

## 2024-08-16 DIAGNOSIS — Z98.890 OTHER SPECIFIED POSTPROCEDURAL STATES: Chronic | ICD-10-CM

## 2024-08-16 DIAGNOSIS — K63.5 POLYP OF COLON: ICD-10-CM

## 2024-08-16 DIAGNOSIS — Z79.85 LONG-TERM (CURRENT) USE OF INJECTABLE NON-INSULIN ANTIDIABETIC DRUGS: ICD-10-CM

## 2024-08-16 PROCEDURE — 88305 TISSUE EXAM BY PATHOLOGIST: CPT | Mod: 26

## 2024-08-16 PROCEDURE — 88305 TISSUE EXAM BY PATHOLOGIST: CPT

## 2024-08-16 PROCEDURE — 45380 COLONOSCOPY AND BIOPSY: CPT

## 2024-08-16 RX ORDER — ORAL SEMAGLUTIDE 14 MG/1
0.5 TABLET ORAL
Refills: 0 | DISCHARGE

## 2024-08-16 RX ORDER — GABAPENTIN 400 MG/1
1 CAPSULE ORAL
Refills: 0 | DISCHARGE

## 2024-08-16 NOTE — ASU PATIENT PROFILE, ADULT - FALL HARM RISK - RISK INTERVENTIONS

## 2024-08-16 NOTE — CHART NOTE - NSCHARTNOTEFT_GEN_A_CORE
PACU ANESTHESIA ADMISSION NOTE      Procedure:   Post op diagnosis:      ____  Intubated  TV:______       Rate: ______      FiO2: ______    __x__  Patent Airway    _x___  Full return of protective reflexes    __x__  Full recovery from anesthesia / back to baseline     Vitals:   T: 37          R:   18               BP: 110/60                 Sat:   99                P: 91      Mental Status:  _x___ Awake   _x____ Alert   _____ Drowsy   _____ Sedated    Nausea/Vomiting:  x____ NO  ______Yes,   See Post - Op Orders          Pain Scale (0-10):  _0____    Treatment: ____ None    ____ See Post - Op/PCA Orders    Post - Operative Fluids:   _x___ Oral   ____ See Post - Op Orders    Plan: Discharge: x  ____Home       _____Floor     _____Critical Care    _____  Other:_________________    Comments:

## 2024-08-19 LAB — SURGICAL PATHOLOGY STUDY: SIGNIFICANT CHANGE UP

## 2024-10-03 ENCOUNTER — APPOINTMENT (OUTPATIENT)
Dept: PAIN MANAGEMENT | Facility: CLINIC | Age: 69
End: 2024-10-03

## 2024-10-17 ENCOUNTER — APPOINTMENT (OUTPATIENT)
Dept: PAIN MANAGEMENT | Facility: CLINIC | Age: 69
End: 2024-10-17
Payer: MEDICARE

## 2024-10-17 DIAGNOSIS — M54.16 RADICULOPATHY, LUMBAR REGION: ICD-10-CM

## 2024-10-17 PROCEDURE — 99214 OFFICE O/P EST MOD 30 MIN: CPT

## 2024-10-28 ENCOUNTER — APPOINTMENT (OUTPATIENT)
Dept: PAIN MANAGEMENT | Facility: CLINIC | Age: 69
End: 2024-10-28
Payer: MEDICARE

## 2024-10-28 ENCOUNTER — APPOINTMENT (OUTPATIENT)
Facility: CLINIC | Age: 69
End: 2024-10-28

## 2024-10-28 DIAGNOSIS — M54.16 RADICULOPATHY, LUMBAR REGION: ICD-10-CM

## 2024-10-28 PROCEDURE — 64484 NJX AA&/STRD TFRM EPI L/S EA: CPT | Mod: LT

## 2024-10-28 PROCEDURE — 64483 NJX AA&/STRD TFRM EPI L/S 1: CPT | Mod: LT

## 2024-11-12 ENCOUNTER — APPOINTMENT (OUTPATIENT)
Dept: PAIN MANAGEMENT | Facility: CLINIC | Age: 69
End: 2024-11-12
Payer: MEDICARE

## 2024-11-12 DIAGNOSIS — M54.16 RADICULOPATHY, LUMBAR REGION: ICD-10-CM

## 2024-11-12 DIAGNOSIS — M48.07 SPINAL STENOSIS, LUMBOSACRAL REGION: ICD-10-CM

## 2024-11-12 PROCEDURE — 99214 OFFICE O/P EST MOD 30 MIN: CPT

## 2024-12-26 ENCOUNTER — APPOINTMENT (OUTPATIENT)
Dept: PAIN MANAGEMENT | Facility: CLINIC | Age: 69
End: 2024-12-26

## 2025-01-02 ENCOUNTER — APPOINTMENT (OUTPATIENT)
Dept: NEUROSURGERY | Facility: CLINIC | Age: 70
End: 2025-01-02

## 2025-01-29 ENCOUNTER — NON-APPOINTMENT (OUTPATIENT)
Age: 70
End: 2025-01-29

## 2025-01-30 ENCOUNTER — APPOINTMENT (OUTPATIENT)
Dept: NEUROSURGERY | Facility: CLINIC | Age: 70
End: 2025-01-30
Payer: MEDICARE

## 2025-01-30 ENCOUNTER — NON-APPOINTMENT (OUTPATIENT)
Age: 70
End: 2025-01-30

## 2025-01-30 VITALS — BODY MASS INDEX: 48.53 KG/M2 | HEIGHT: 66 IN | WEIGHT: 302 LBS

## 2025-01-30 DIAGNOSIS — M54.16 RADICULOPATHY, LUMBAR REGION: ICD-10-CM

## 2025-01-30 DIAGNOSIS — M51.16 INTERVERTEBRAL DISC DISORDERS WITH RADICULOPATHY, LUMBAR REGION: ICD-10-CM

## 2025-01-30 PROCEDURE — 99203 OFFICE O/P NEW LOW 30 MIN: CPT

## 2025-04-02 ENCOUNTER — APPOINTMENT (OUTPATIENT)
Dept: CARDIOLOGY | Facility: CLINIC | Age: 70
End: 2025-04-02

## 2025-04-02 ENCOUNTER — NON-APPOINTMENT (OUTPATIENT)
Age: 70
End: 2025-04-02

## 2025-04-02 VITALS — WEIGHT: 297 LBS | BODY MASS INDEX: 47.73 KG/M2 | HEIGHT: 66 IN

## 2025-04-02 VITALS — HEART RATE: 98 BPM | SYSTOLIC BLOOD PRESSURE: 122 MMHG | DIASTOLIC BLOOD PRESSURE: 80 MMHG

## 2025-04-02 DIAGNOSIS — E08.42 DIABETES MELLITUS DUE TO UNDERLYING CONDITION WITH DIABETIC POLYNEUROPATHY: ICD-10-CM

## 2025-04-02 DIAGNOSIS — I50.22 CHRONIC SYSTOLIC (CONGESTIVE) HEART FAILURE: ICD-10-CM

## 2025-04-02 DIAGNOSIS — R07.9 CHEST PAIN, UNSPECIFIED: ICD-10-CM

## 2025-04-02 DIAGNOSIS — I87.2 VENOUS INSUFFICIENCY (CHRONIC) (PERIPHERAL): ICD-10-CM

## 2025-04-02 DIAGNOSIS — M79.89 OTHER SPECIFIED SOFT TISSUE DISORDERS: ICD-10-CM

## 2025-04-02 DIAGNOSIS — I83.023 VARICOSE VEINS OF LEFT LOWER EXTREMITY WITH ULCER OF ANKLE: ICD-10-CM

## 2025-04-02 DIAGNOSIS — I25.10 ATHEROSCLEROTIC HEART DISEASE OF NATIVE CORONARY ARTERY W/OUT ANGINA PECTORIS: ICD-10-CM

## 2025-04-02 DIAGNOSIS — L97.321 VARICOSE VEINS OF LEFT LOWER EXTREMITY WITH ULCER OF ANKLE: ICD-10-CM

## 2025-04-02 DIAGNOSIS — E66.01 MORBID (SEVERE) OBESITY DUE TO EXCESS CALORIES: ICD-10-CM

## 2025-04-02 DIAGNOSIS — E11.65 TYPE 2 DIABETES MELLITUS WITH HYPERGLYCEMIA: ICD-10-CM

## 2025-04-02 PROCEDURE — 93000 ELECTROCARDIOGRAM COMPLETE: CPT

## 2025-04-02 PROCEDURE — G2211 COMPLEX E/M VISIT ADD ON: CPT

## 2025-04-02 PROCEDURE — 99214 OFFICE O/P EST MOD 30 MIN: CPT

## 2025-04-02 RX ORDER — IRBESARTAN 300 MG/1
300 TABLET ORAL
Refills: 0 | Status: ACTIVE | COMMUNITY

## 2025-04-02 RX ORDER — SEMAGLUTIDE 0.68 MG/ML
INJECTION, SOLUTION SUBCUTANEOUS
Refills: 0 | Status: ACTIVE | COMMUNITY

## 2025-04-02 RX ORDER — ALLOPURINOL 100 MG/1
100 TABLET ORAL
Refills: 0 | Status: ACTIVE | COMMUNITY

## 2025-04-02 RX ORDER — ROSUVASTATIN CALCIUM 20 MG/1
20 TABLET, FILM COATED ORAL
Refills: 0 | Status: ACTIVE | COMMUNITY

## 2025-05-29 ENCOUNTER — APPOINTMENT (OUTPATIENT)
Dept: CV DIAGNOSTICS | Facility: HOSPITAL | Age: 70
End: 2025-05-29
Payer: MEDICARE

## 2025-05-29 ENCOUNTER — OUTPATIENT (OUTPATIENT)
Dept: OUTPATIENT SERVICES | Facility: HOSPITAL | Age: 70
LOS: 1 days | End: 2025-05-29
Payer: MEDICARE

## 2025-05-29 DIAGNOSIS — M79.89 OTHER SPECIFIED SOFT TISSUE DISORDERS: ICD-10-CM

## 2025-05-29 DIAGNOSIS — I87.2 VENOUS INSUFFICIENCY (CHRONIC) (PERIPHERAL): ICD-10-CM

## 2025-05-29 DIAGNOSIS — Z98.890 OTHER SPECIFIED POSTPROCEDURAL STATES: Chronic | ICD-10-CM

## 2025-05-29 DIAGNOSIS — Z98.49 CATARACT EXTRACTION STATUS, UNSPECIFIED EYE: Chronic | ICD-10-CM

## 2025-05-29 PROCEDURE — 93016 CV STRESS TEST SUPVJ ONLY: CPT

## 2025-05-29 PROCEDURE — 93018 CV STRESS TEST I&R ONLY: CPT

## 2025-05-29 PROCEDURE — 93017 CV STRESS TEST TRACING ONLY: CPT

## 2025-05-29 PROCEDURE — 78452 HT MUSCLE IMAGE SPECT MULT: CPT | Mod: 26

## 2025-05-29 PROCEDURE — A9500: CPT

## 2025-05-29 PROCEDURE — 78452 HT MUSCLE IMAGE SPECT MULT: CPT

## 2025-05-30 DIAGNOSIS — M79.89 OTHER SPECIFIED SOFT TISSUE DISORDERS: ICD-10-CM

## 2025-05-30 DIAGNOSIS — I87.2 VENOUS INSUFFICIENCY (CHRONIC) (PERIPHERAL): ICD-10-CM

## 2025-06-09 ENCOUNTER — APPOINTMENT (OUTPATIENT)
Dept: CARDIOLOGY | Facility: CLINIC | Age: 70
End: 2025-06-09
Payer: MEDICARE

## 2025-06-09 PROCEDURE — 93306 TTE W/DOPPLER COMPLETE: CPT

## 2025-06-27 ENCOUNTER — APPOINTMENT (OUTPATIENT)
Dept: VASCULAR SURGERY | Facility: CLINIC | Age: 70
End: 2025-06-27

## 2025-06-27 ENCOUNTER — APPOINTMENT (OUTPATIENT)
Dept: VASCULAR SURGERY | Facility: CLINIC | Age: 70
End: 2025-06-27
Payer: MEDICARE

## 2025-06-27 PROCEDURE — 29580 STRAPPING UNNA BOOT: CPT | Mod: 50

## 2025-06-27 RX ORDER — CEPHALEXIN 500 MG/1
500 CAPSULE ORAL 3 TIMES DAILY
Qty: 30 | Refills: 0 | Status: ACTIVE | COMMUNITY
Start: 2025-06-27 | End: 1900-01-01

## 2025-07-02 ENCOUNTER — APPOINTMENT (OUTPATIENT)
Dept: VASCULAR SURGERY | Facility: CLINIC | Age: 70
End: 2025-07-02
Payer: MEDICARE

## 2025-07-02 PROCEDURE — 29580 STRAPPING UNNA BOOT: CPT | Mod: LT

## 2025-07-03 ENCOUNTER — APPOINTMENT (OUTPATIENT)
Dept: CARDIOLOGY | Facility: CLINIC | Age: 70
End: 2025-07-03

## 2025-07-03 VITALS — DIASTOLIC BLOOD PRESSURE: 82 MMHG | SYSTOLIC BLOOD PRESSURE: 122 MMHG

## 2025-07-03 VITALS — WEIGHT: 295 LBS | TEMPERATURE: 98 F | BODY MASS INDEX: 47.41 KG/M2 | HEIGHT: 66 IN

## 2025-07-03 PROBLEM — E85.9 AMYLOID DISEASE: Status: ACTIVE | Noted: 2025-07-03

## 2025-07-03 PROBLEM — I50.20 HEART FAILURE WITH MILDLY REDUCED EJECTION FRACTION (HFMREF): Status: ACTIVE | Noted: 2023-12-13

## 2025-07-03 PROCEDURE — 99214 OFFICE O/P EST MOD 30 MIN: CPT

## 2025-07-03 PROCEDURE — G2211 COMPLEX E/M VISIT ADD ON: CPT

## 2025-07-03 RX ORDER — METOPROLOL SUCCINATE 25 MG/1
25 TABLET, EXTENDED RELEASE ORAL DAILY
Qty: 90 | Refills: 3 | Status: ACTIVE | COMMUNITY
Start: 2025-07-03 | End: 1900-01-01

## 2025-07-09 ENCOUNTER — APPOINTMENT (OUTPATIENT)
Dept: VASCULAR SURGERY | Facility: CLINIC | Age: 70
End: 2025-07-09
Payer: MEDICARE

## 2025-07-09 PROCEDURE — 29580 STRAPPING UNNA BOOT: CPT | Mod: LT

## 2025-07-16 ENCOUNTER — APPOINTMENT (OUTPATIENT)
Dept: VASCULAR SURGERY | Facility: CLINIC | Age: 70
End: 2025-07-16
Payer: MEDICARE

## 2025-07-16 PROBLEM — I83.019 VENOUS ULCER OF RIGHT LEG: Status: ACTIVE | Noted: 2025-07-16

## 2025-07-16 PROCEDURE — 29580 STRAPPING UNNA BOOT: CPT | Mod: RT

## 2025-07-23 ENCOUNTER — APPOINTMENT (OUTPATIENT)
Dept: VASCULAR SURGERY | Facility: CLINIC | Age: 70
End: 2025-07-23
Payer: MEDICARE

## 2025-07-23 DIAGNOSIS — I87.2 VENOUS INSUFFICIENCY (CHRONIC) (PERIPHERAL): ICD-10-CM

## 2025-07-23 PROCEDURE — 99212 OFFICE O/P EST SF 10 MIN: CPT

## 2025-08-20 ENCOUNTER — APPOINTMENT (OUTPATIENT)
Dept: VASCULAR SURGERY | Facility: CLINIC | Age: 70
End: 2025-08-20
Payer: MEDICARE

## 2025-08-20 DIAGNOSIS — L97.919 VARICOSE VEINS OF RIGHT LOWER EXTREMITY WITH ULCER OF UNSPECIFIED SITE: ICD-10-CM

## 2025-08-20 DIAGNOSIS — I83.019 VARICOSE VEINS OF RIGHT LOWER EXTREMITY WITH ULCER OF UNSPECIFIED SITE: ICD-10-CM

## 2025-08-20 PROCEDURE — 29580 STRAPPING UNNA BOOT: CPT | Mod: 50

## 2025-08-20 RX ORDER — SILVER SULFADIAZINE 10 MG/G
1 CREAM TOPICAL
Qty: 1 | Refills: 0 | Status: ACTIVE | COMMUNITY
Start: 2025-08-20 | End: 1900-01-01

## 2025-08-27 ENCOUNTER — APPOINTMENT (OUTPATIENT)
Dept: VASCULAR SURGERY | Facility: CLINIC | Age: 70
End: 2025-08-27

## 2025-08-27 DIAGNOSIS — I83.029 VARICOSE VEINS OF LEFT LOWER EXTREMITY WITH ULCER OF UNSPECIFIED SITE: ICD-10-CM

## 2025-08-27 DIAGNOSIS — L97.929 VARICOSE VEINS OF LEFT LOWER EXTREMITY WITH ULCER OF UNSPECIFIED SITE: ICD-10-CM

## 2025-08-27 PROCEDURE — 29580 STRAPPING UNNA BOOT: CPT | Mod: LT

## 2025-09-03 ENCOUNTER — APPOINTMENT (OUTPATIENT)
Dept: VASCULAR SURGERY | Facility: CLINIC | Age: 70
End: 2025-09-03
Payer: MEDICARE

## 2025-09-03 DIAGNOSIS — M79.89 OTHER SPECIFIED SOFT TISSUE DISORDERS: ICD-10-CM

## 2025-09-03 DIAGNOSIS — L97.321 VARICOSE VEINS OF LEFT LOWER EXTREMITY WITH ULCER OF ANKLE: ICD-10-CM

## 2025-09-03 DIAGNOSIS — I83.023 VARICOSE VEINS OF LEFT LOWER EXTREMITY WITH ULCER OF ANKLE: ICD-10-CM

## 2025-09-03 PROCEDURE — 29580 STRAPPING UNNA BOOT: CPT | Mod: LT

## 2025-09-10 ENCOUNTER — APPOINTMENT (OUTPATIENT)
Dept: VASCULAR SURGERY | Facility: CLINIC | Age: 70
End: 2025-09-10
Payer: MEDICARE

## 2025-09-10 DIAGNOSIS — I83.029 VARICOSE VEINS OF LEFT LOWER EXTREMITY WITH ULCER OF UNSPECIFIED SITE: ICD-10-CM

## 2025-09-10 DIAGNOSIS — L97.929 VARICOSE VEINS OF LEFT LOWER EXTREMITY WITH ULCER OF UNSPECIFIED SITE: ICD-10-CM

## 2025-09-10 PROCEDURE — 99212 OFFICE O/P EST SF 10 MIN: CPT
